# Patient Record
Sex: MALE | Race: WHITE | Employment: OTHER | URBAN - METROPOLITAN AREA
[De-identification: names, ages, dates, MRNs, and addresses within clinical notes are randomized per-mention and may not be internally consistent; named-entity substitution may affect disease eponyms.]

---

## 2017-01-01 ENCOUNTER — OFFICE VISIT (OUTPATIENT)
Dept: UROLOGY | Facility: CLINIC | Age: 82
End: 2017-01-01
Payer: COMMERCIAL

## 2017-01-01 ENCOUNTER — DOCUMENTATION ONLY (OUTPATIENT)
Dept: CARE COORDINATION | Facility: CLINIC | Age: 82
End: 2017-01-01

## 2017-01-01 ENCOUNTER — PRE VISIT (OUTPATIENT)
Dept: UROLOGY | Facility: CLINIC | Age: 82
End: 2017-01-01

## 2017-01-01 ENCOUNTER — TELEPHONE (OUTPATIENT)
Dept: UROLOGY | Facility: CLINIC | Age: 82
End: 2017-01-01

## 2017-01-01 ENCOUNTER — HOSPITAL ENCOUNTER (OUTPATIENT)
Facility: CLINIC | Age: 82
Setting detail: OBSERVATION
Discharge: NURSING, REHAB OR LONG-TERM CARE | End: 2017-01-09
Attending: EMERGENCY MEDICINE | Admitting: INTERNAL MEDICINE
Payer: COMMERCIAL

## 2017-01-01 ENCOUNTER — CARE COORDINATION (OUTPATIENT)
Dept: CASE MANAGEMENT | Facility: CLINIC | Age: 82
End: 2017-01-01

## 2017-01-01 ENCOUNTER — APPOINTMENT (OUTPATIENT)
Dept: GENERAL RADIOLOGY | Facility: CLINIC | Age: 82
End: 2017-01-01
Attending: EMERGENCY MEDICINE
Payer: COMMERCIAL

## 2017-01-01 ENCOUNTER — TELEPHONE (OUTPATIENT)
Dept: FAMILY MEDICINE | Facility: CLINIC | Age: 82
End: 2017-01-01

## 2017-01-01 ENCOUNTER — OFFICE VISIT (OUTPATIENT)
Dept: PULMONOLOGY | Facility: CLINIC | Age: 82
End: 2017-01-01
Payer: COMMERCIAL

## 2017-01-01 ENCOUNTER — TELEPHONE (OUTPATIENT)
Dept: CARDIOLOGY | Facility: CLINIC | Age: 82
End: 2017-01-01

## 2017-01-01 ENCOUNTER — DOCUMENTATION ONLY (OUTPATIENT)
Dept: CASE MANAGEMENT | Facility: CLINIC | Age: 82
End: 2017-01-01

## 2017-01-01 ENCOUNTER — TELEPHONE (OUTPATIENT)
Dept: NURSING | Facility: CLINIC | Age: 82
End: 2017-01-01

## 2017-01-01 ENCOUNTER — HOSPITAL ENCOUNTER (OUTPATIENT)
Dept: ULTRASOUND IMAGING | Facility: CLINIC | Age: 82
End: 2017-02-02
Attending: SURGERY
Payer: COMMERCIAL

## 2017-01-01 ENCOUNTER — INFUSION THERAPY VISIT (OUTPATIENT)
Dept: INFUSION THERAPY | Facility: CLINIC | Age: 82
End: 2017-01-01
Payer: COMMERCIAL

## 2017-01-01 ENCOUNTER — OFFICE VISIT (OUTPATIENT)
Dept: OTHER | Facility: CLINIC | Age: 82
End: 2017-01-01
Attending: SURGERY
Payer: COMMERCIAL

## 2017-01-01 ENCOUNTER — TRANSFERRED RECORDS (OUTPATIENT)
Dept: HEALTH INFORMATION MANAGEMENT | Facility: CLINIC | Age: 82
End: 2017-01-01

## 2017-01-01 ENCOUNTER — HOSPITAL ENCOUNTER (OUTPATIENT)
Dept: ULTRASOUND IMAGING | Facility: CLINIC | Age: 82
Discharge: HOME | End: 2017-02-02
Attending: SURGERY | Admitting: SURGERY
Payer: COMMERCIAL

## 2017-01-01 ENCOUNTER — OFFICE VISIT (OUTPATIENT)
Dept: FAMILY MEDICINE | Facility: CLINIC | Age: 82
End: 2017-01-01
Payer: COMMERCIAL

## 2017-01-01 ENCOUNTER — APPOINTMENT (OUTPATIENT)
Dept: PHYSICAL THERAPY | Facility: CLINIC | Age: 82
End: 2017-01-01
Attending: PHYSICIAN ASSISTANT
Payer: COMMERCIAL

## 2017-01-01 ENCOUNTER — TELEPHONE (OUTPATIENT)
Dept: PHARMACY | Facility: CLINIC | Age: 82
End: 2017-01-01

## 2017-01-01 ENCOUNTER — MEDICAL CORRESPONDENCE (OUTPATIENT)
Dept: HEALTH INFORMATION MANAGEMENT | Facility: CLINIC | Age: 82
End: 2017-01-01

## 2017-01-01 VITALS
TEMPERATURE: 98.4 F | HEART RATE: 96 BPM | DIASTOLIC BLOOD PRESSURE: 68 MMHG | SYSTOLIC BLOOD PRESSURE: 118 MMHG | RESPIRATION RATE: 16 BRPM | OXYGEN SATURATION: 96 %

## 2017-01-01 VITALS
OXYGEN SATURATION: 96 % | TEMPERATURE: 97.9 F | HEART RATE: 72 BPM | SYSTOLIC BLOOD PRESSURE: 151 MMHG | RESPIRATION RATE: 18 BRPM | DIASTOLIC BLOOD PRESSURE: 73 MMHG

## 2017-01-01 VITALS — RESPIRATION RATE: 16 BRPM | SYSTOLIC BLOOD PRESSURE: 90 MMHG | DIASTOLIC BLOOD PRESSURE: 60 MMHG | HEART RATE: 66 BPM

## 2017-01-01 VITALS
TEMPERATURE: 96.8 F | SYSTOLIC BLOOD PRESSURE: 156 MMHG | HEART RATE: 61 BPM | OXYGEN SATURATION: 92 % | DIASTOLIC BLOOD PRESSURE: 70 MMHG

## 2017-01-01 VITALS
RESPIRATION RATE: 18 BRPM | TEMPERATURE: 97.5 F | BODY MASS INDEX: 20.37 KG/M2 | OXYGEN SATURATION: 97 % | DIASTOLIC BLOOD PRESSURE: 69 MMHG | SYSTOLIC BLOOD PRESSURE: 117 MMHG | WEIGHT: 138 LBS | HEART RATE: 80 BPM

## 2017-01-01 VITALS
WEIGHT: 137.79 LBS | SYSTOLIC BLOOD PRESSURE: 151 MMHG | TEMPERATURE: 97.2 F | RESPIRATION RATE: 16 BRPM | BODY MASS INDEX: 20.41 KG/M2 | DIASTOLIC BLOOD PRESSURE: 65 MMHG | HEIGHT: 69 IN | OXYGEN SATURATION: 96 %

## 2017-01-01 VITALS
SYSTOLIC BLOOD PRESSURE: 131 MMHG | HEART RATE: 80 BPM | DIASTOLIC BLOOD PRESSURE: 63 MMHG | WEIGHT: 140 LBS | OXYGEN SATURATION: 95 % | BODY MASS INDEX: 20.73 KG/M2 | HEIGHT: 69 IN

## 2017-01-01 VITALS — SYSTOLIC BLOOD PRESSURE: 132 MMHG | HEART RATE: 80 BPM | DIASTOLIC BLOOD PRESSURE: 63 MMHG

## 2017-01-01 DIAGNOSIS — R52 PAIN: Primary | ICD-10-CM

## 2017-01-01 DIAGNOSIS — Z98.890 HISTORY OF CAROTID ENDARTERECTOMY: ICD-10-CM

## 2017-01-01 DIAGNOSIS — N18.30 CHRONIC KIDNEY DISEASE, STAGE III (MODERATE) (H): ICD-10-CM

## 2017-01-01 DIAGNOSIS — I71.40 ABDOMINAL AORTIC ANEURYSM (H): ICD-10-CM

## 2017-01-01 DIAGNOSIS — R53.1 WEAKNESS: Primary | ICD-10-CM

## 2017-01-01 DIAGNOSIS — Z98.890 S/P AAA REPAIR: ICD-10-CM

## 2017-01-01 DIAGNOSIS — Z13.89 SCREENING FOR DIABETIC PERIPHERAL NEUROPATHY: ICD-10-CM

## 2017-01-01 DIAGNOSIS — D64.89 ANEMIA DUE TO OTHER CAUSE: ICD-10-CM

## 2017-01-01 DIAGNOSIS — D64.9 ANEMIA, UNSPECIFIED TYPE: Primary | ICD-10-CM

## 2017-01-01 DIAGNOSIS — F41.9 ANXIETY: ICD-10-CM

## 2017-01-01 DIAGNOSIS — R53.1 WEAKNESS: ICD-10-CM

## 2017-01-01 DIAGNOSIS — N18.9 ANEMIA IN CKD (CHRONIC KIDNEY DISEASE): Primary | ICD-10-CM

## 2017-01-01 DIAGNOSIS — Z98.890 S/P BILATERAL CAROTID ENDARTERECTOMY: ICD-10-CM

## 2017-01-01 DIAGNOSIS — J90 PLEURAL EFFUSION: Primary | ICD-10-CM

## 2017-01-01 DIAGNOSIS — D63.1 ANEMIA IN CKD (CHRONIC KIDNEY DISEASE): Primary | ICD-10-CM

## 2017-01-01 DIAGNOSIS — D63.1 ANEMIA OF CHRONIC RENAL FAILURE, STAGE 3 (MODERATE) (H): Primary | ICD-10-CM

## 2017-01-01 DIAGNOSIS — R33.9 RETENTION OF URINE: Primary | ICD-10-CM

## 2017-01-01 DIAGNOSIS — N18.30 ANEMIA OF CHRONIC RENAL FAILURE, STAGE 3 (MODERATE) (H): Primary | ICD-10-CM

## 2017-01-01 DIAGNOSIS — N18.9 ANEMIA OF CHRONIC RENAL FAILURE: Primary | ICD-10-CM

## 2017-01-01 DIAGNOSIS — D64.89 ANEMIA DUE TO OTHER CAUSE: Primary | ICD-10-CM

## 2017-01-01 DIAGNOSIS — I42.9 CARDIOMYOPATHY (H): ICD-10-CM

## 2017-01-01 DIAGNOSIS — D63.1 ANEMIA OF CHRONIC RENAL FAILURE: Primary | ICD-10-CM

## 2017-01-01 DIAGNOSIS — Z98.890 HISTORY OF AAA (ABDOMINAL AORTIC ANEURYSM) REPAIR: Primary | ICD-10-CM

## 2017-01-01 DIAGNOSIS — Z86.79 S/P AAA REPAIR: ICD-10-CM

## 2017-01-01 DIAGNOSIS — R33.9 URINARY RETENTION: Primary | ICD-10-CM

## 2017-01-01 DIAGNOSIS — N40.1 BENIGN PROSTATIC HYPERPLASIA WITH LOWER URINARY TRACT SYMPTOMS, UNSPECIFIED MORPHOLOGY: ICD-10-CM

## 2017-01-01 DIAGNOSIS — Z98.890 H/O CAROTID ENDARTERECTOMY: Primary | ICD-10-CM

## 2017-01-01 LAB
ABO + RH BLD: ABNORMAL
ABO + RH BLD: ABNORMAL
ALBUMIN UR-MCNC: 100 MG/DL
ANION GAP SERPL CALCULATED.3IONS-SCNC: 10 MMOL/L (ref 3–14)
ANION GAP SERPL CALCULATED.3IONS-SCNC: 9 MMOL/L (ref 3–14)
APPEARANCE UR: CLEAR
BASOPHILS # BLD AUTO: 0 10E9/L (ref 0–0.2)
BASOPHILS # BLD AUTO: 0 10E9/L (ref 0–0.2)
BASOPHILS NFR BLD AUTO: 0.1 %
BASOPHILS NFR BLD AUTO: 0.4 %
BILIRUB UR QL STRIP: NEGATIVE
BLD GP AB SCN SERPL QL: ABNORMAL
BLD PROD TYP BPU: ABNORMAL
BLD PROD TYP BPU: NORMAL
BLD UNIT ID BPU: 0
BLOOD BANK CMNT PATIENT-IMP: ABNORMAL
BLOOD PRODUCT CODE: NORMAL
BPU ID: NORMAL
BUN SERPL-MCNC: 26 MG/DL (ref 7–30)
BUN SERPL-MCNC: 26 MG/DL (ref 7–30)
CALCIUM SERPL-MCNC: 7.8 MG/DL (ref 8.5–10.1)
CALCIUM SERPL-MCNC: 7.9 MG/DL (ref 8.5–10.1)
CHLORIDE SERPL-SCNC: 106 MMOL/L (ref 94–109)
CHLORIDE SERPL-SCNC: 109 MMOL/L (ref 94–109)
CO2 SERPL-SCNC: 25 MMOL/L (ref 20–32)
CO2 SERPL-SCNC: 27 MMOL/L (ref 20–32)
COLOR UR AUTO: YELLOW
CREAT SERPL-MCNC: 2.13 MG/DL (ref 0.66–1.25)
CREAT SERPL-MCNC: 2.16 MG/DL (ref 0.66–1.25)
DIFFERENTIAL METHOD BLD: ABNORMAL
DIFFERENTIAL METHOD BLD: ABNORMAL
EOSINOPHIL # BLD AUTO: 0.1 10E9/L (ref 0–0.7)
EOSINOPHIL # BLD AUTO: 0.2 10E9/L (ref 0–0.7)
EOSINOPHIL NFR BLD AUTO: 1.5 %
EOSINOPHIL NFR BLD AUTO: 2.3 %
ERYTHROCYTE [DISTWIDTH] IN BLOOD BY AUTOMATED COUNT: 14.8 % (ref 10–15)
ERYTHROCYTE [DISTWIDTH] IN BLOOD BY AUTOMATED COUNT: 14.9 % (ref 10–15)
ERYTHROCYTE [DISTWIDTH] IN BLOOD BY AUTOMATED COUNT: 16.4 % (ref 10–15)
ERYTHROCYTE [DISTWIDTH] IN BLOOD BY AUTOMATED COUNT: 17.5 % (ref 10–15)
FERRITIN SERPL-MCNC: 418 NG/ML (ref 26–388)
GFR SERPL CREATININE-BSD FRML MDRD: 30 ML/MIN/1.7M2
GFR SERPL CREATININE-BSD FRML MDRD: 30 ML/MIN/1.7M2
GLUCOSE BLDC GLUCOMTR-MCNC: 105 MG/DL (ref 70–99)
GLUCOSE BLDC GLUCOMTR-MCNC: 106 MG/DL (ref 70–99)
GLUCOSE BLDC GLUCOMTR-MCNC: 110 MG/DL (ref 70–99)
GLUCOSE BLDC GLUCOMTR-MCNC: 122 MG/DL (ref 70–99)
GLUCOSE BLDC GLUCOMTR-MCNC: 123 MG/DL (ref 70–99)
GLUCOSE BLDC GLUCOMTR-MCNC: 127 MG/DL (ref 70–99)
GLUCOSE BLDC GLUCOMTR-MCNC: 136 MG/DL (ref 70–99)
GLUCOSE BLDC GLUCOMTR-MCNC: 93 MG/DL (ref 70–99)
GLUCOSE SERPL-MCNC: 144 MG/DL (ref 70–99)
GLUCOSE SERPL-MCNC: 227 MG/DL (ref 70–99)
GLUCOSE UR STRIP-MCNC: NEGATIVE MG/DL
HBA1C MFR BLD: 6.9 % (ref 4.3–6)
HCT VFR BLD AUTO: 23.1 % (ref 40–53)
HCT VFR BLD AUTO: 27.9 % (ref 40–53)
HCT VFR BLD AUTO: 28.6 % (ref 40–53)
HCT VFR BLD AUTO: 31.5 % (ref 40–53)
HEMOCCULT STL QL: NEGATIVE
HEMOCCULT STL QL: NEGATIVE
HGB BLD-MCNC: 7 G/DL (ref 13.3–17.7)
HGB BLD-MCNC: 8.1 G/DL (ref 13.3–17.7)
HGB BLD-MCNC: 8.3 G/DL (ref 13.3–17.7)
HGB BLD-MCNC: 9.3 G/DL (ref 13.3–17.7)
HGB BLD-MCNC: 9.9 G/DL (ref 13.3–17.7)
HGB UR QL STRIP: NEGATIVE
HYALINE CASTS #/AREA URNS LPF: 1 /LPF (ref 0–2)
IMM GRANULOCYTES # BLD: 0 10E9/L (ref 0–0.4)
IMM GRANULOCYTES NFR BLD: 0.4 %
INR PPP: 1.51 (ref 0.86–1.14)
INTERPRETATION ECG - MUSE: NORMAL
IRON SATN MFR SERPL: 11 % (ref 15–46)
IRON SERPL-MCNC: 11 UG/DL (ref 35–180)
KETONES UR STRIP-MCNC: NEGATIVE MG/DL
LEUKOCYTE ESTERASE UR QL STRIP: ABNORMAL
LYMPHOCYTES # BLD AUTO: 0.9 10E9/L (ref 0.8–5.3)
LYMPHOCYTES # BLD AUTO: 1.9 10E9/L (ref 0.8–5.3)
LYMPHOCYTES NFR BLD AUTO: 12.6 %
LYMPHOCYTES NFR BLD AUTO: 23.7 %
MCH RBC QN AUTO: 25.8 PG (ref 26.5–33)
MCH RBC QN AUTO: 27 PG (ref 26.5–33)
MCH RBC QN AUTO: 27.5 PG (ref 26.5–33)
MCH RBC QN AUTO: 28.3 PG (ref 26.5–33)
MCHC RBC AUTO-ENTMCNC: 28.3 G/DL (ref 31.5–36.5)
MCHC RBC AUTO-ENTMCNC: 29.7 G/DL (ref 31.5–36.5)
MCHC RBC AUTO-ENTMCNC: 30.3 G/DL (ref 31.5–36.5)
MCHC RBC AUTO-ENTMCNC: 31.4 G/DL (ref 31.5–36.5)
MCV RBC AUTO: 89 FL (ref 78–100)
MCV RBC AUTO: 90 FL (ref 78–100)
MCV RBC AUTO: 91 FL (ref 78–100)
MCV RBC AUTO: 92 FL (ref 78–100)
MONOCYTES # BLD AUTO: 0.5 10E9/L (ref 0–1.3)
MONOCYTES # BLD AUTO: 0.6 10E9/L (ref 0–1.3)
MONOCYTES NFR BLD AUTO: 6.6 %
MONOCYTES NFR BLD AUTO: 7.8 %
MUCOUS THREADS #/AREA URNS LPF: PRESENT /LPF
NEUTROPHILS # BLD AUTO: 5.5 10E9/L (ref 1.6–8.3)
NEUTROPHILS # BLD AUTO: 5.6 10E9/L (ref 1.6–8.3)
NEUTROPHILS NFR BLD AUTO: 67.3 %
NEUTROPHILS NFR BLD AUTO: 77.3 %
NITRATE UR QL: NEGATIVE
NRBC # BLD AUTO: 0 10*3/UL
NRBC BLD AUTO-RTO: 0 /100
NUM BPU REQUESTED: 4
PH UR STRIP: 5.5 PH (ref 5–7)
PLATELET # BLD AUTO: 213 10E9/L (ref 150–450)
PLATELET # BLD AUTO: 237 10E9/L (ref 150–450)
PLATELET # BLD AUTO: 238 10E9/L (ref 150–450)
PLATELET # BLD AUTO: 242 10E9/L (ref 150–450)
POTASSIUM SERPL-SCNC: 4.2 MMOL/L (ref 3.4–5.3)
POTASSIUM SERPL-SCNC: 4.2 MMOL/L (ref 3.4–5.3)
RBC # BLD AUTO: 2.59 10E12/L (ref 4.4–5.9)
RBC # BLD AUTO: 3.02 10E12/L (ref 4.4–5.9)
RBC # BLD AUTO: 3.14 10E12/L (ref 4.4–5.9)
RBC # BLD AUTO: 3.5 10E12/L (ref 4.4–5.9)
RBC #/AREA URNS AUTO: 2 /HPF (ref 0–2)
RETICS # AUTO: 38.8 10E9/L (ref 25–95)
RETICS/RBC NFR AUTO: 1.5 % (ref 0.5–2)
SODIUM SERPL-SCNC: 141 MMOL/L (ref 133–144)
SODIUM SERPL-SCNC: 145 MMOL/L (ref 133–144)
SP GR UR STRIP: 1.01 (ref 1–1.03)
SPECIMEN EXP DATE BLD: ABNORMAL
TIBC SERPL-MCNC: 104 UG/DL (ref 240–430)
TRANSFERRIN SERPL-MCNC: 84 MG/DL (ref 210–360)
TRANSFUSION STATUS PATIENT QL: NORMAL
TROPONIN I SERPL-MCNC: NORMAL UG/L (ref 0–0.04)
URN SPEC COLLECT METH UR: ABNORMAL
UROBILINOGEN UR STRIP-MCNC: NORMAL MG/DL (ref 0–2)
WBC # BLD AUTO: 7.2 10E9/L (ref 4–11)
WBC # BLD AUTO: 8.2 10E9/L (ref 4–11)
WBC # BLD AUTO: 9.2 10E9/L (ref 4–11)
WBC # BLD AUTO: 9.9 10E9/L (ref 4–11)
WBC #/AREA URNS AUTO: 10 /HPF (ref 0–2)

## 2017-01-01 PROCEDURE — 84484 ASSAY OF TROPONIN QUANT: CPT | Performed by: EMERGENCY MEDICINE

## 2017-01-01 PROCEDURE — 93880 EXTRACRANIAL BILAT STUDY: CPT

## 2017-01-01 PROCEDURE — 25000125 ZZHC RX 250: Performed by: INTERNAL MEDICINE

## 2017-01-01 PROCEDURE — G0378 HOSPITAL OBSERVATION PER HR: HCPCS

## 2017-01-01 PROCEDURE — 81001 URINALYSIS AUTO W/SCOPE: CPT | Performed by: INTERNAL MEDICINE

## 2017-01-01 PROCEDURE — 40000275 ZZH STATISTIC RCP TIME EA 10 MIN

## 2017-01-01 PROCEDURE — 82272 OCCULT BLD FECES 1-3 TESTS: CPT | Performed by: EMERGENCY MEDICINE

## 2017-01-01 PROCEDURE — 93225 XTRNL ECG REC<48 HRS REC: CPT

## 2017-01-01 PROCEDURE — 94640 AIRWAY INHALATION TREATMENT: CPT | Mod: 76

## 2017-01-01 PROCEDURE — 97161 PT EVAL LOW COMPLEX 20 MIN: CPT | Mod: GP

## 2017-01-01 PROCEDURE — 96374 THER/PROPH/DIAG INJ IV PUSH: CPT | Performed by: INTERNAL MEDICINE

## 2017-01-01 PROCEDURE — 51798 US URINE CAPACITY MEASURE: CPT | Performed by: UROLOGY

## 2017-01-01 PROCEDURE — 85027 COMPLETE CBC AUTOMATED: CPT | Performed by: INTERNAL MEDICINE

## 2017-01-01 PROCEDURE — 25000132 ZZH RX MED GY IP 250 OP 250 PS 637: Performed by: INTERNAL MEDICINE

## 2017-01-01 PROCEDURE — 86902 BLOOD TYPE ANTIGEN DONOR EA: CPT | Performed by: EMERGENCY MEDICINE

## 2017-01-01 PROCEDURE — 25000132 ZZH RX MED GY IP 250 OP 250 PS 637: Performed by: PHYSICIAN ASSISTANT

## 2017-01-01 PROCEDURE — 93978 VASCULAR STUDY: CPT

## 2017-01-01 PROCEDURE — 99211 OFF/OP EST MAY X REQ PHY/QHP: CPT

## 2017-01-01 PROCEDURE — P9016 RBC LEUKOCYTES REDUCED: HCPCS | Performed by: EMERGENCY MEDICINE

## 2017-01-01 PROCEDURE — 99213 OFFICE O/P EST LOW 20 MIN: CPT | Performed by: PHYSICIAN ASSISTANT

## 2017-01-01 PROCEDURE — 00000146 ZZHCL STATISTIC GLUCOSE BY METER IP

## 2017-01-01 PROCEDURE — 99220 ZZC INITIAL OBSERVATION CARE,LEVL III: CPT | Performed by: INTERNAL MEDICINE

## 2017-01-01 PROCEDURE — 36430 TRANSFUSION BLD/BLD COMPNT: CPT

## 2017-01-01 PROCEDURE — 36415 COLL VENOUS BLD VENIPUNCTURE: CPT | Performed by: FAMILY MEDICINE

## 2017-01-01 PROCEDURE — 82272 OCCULT BLD FECES 1-3 TESTS: CPT | Performed by: FAMILY MEDICINE

## 2017-01-01 PROCEDURE — 93227 XTRNL ECG REC<48 HR R&I: CPT | Performed by: INTERNAL MEDICINE

## 2017-01-01 PROCEDURE — 25000125 ZZHC RX 250: Mod: ZNDC | Performed by: PHYSICIAN ASSISTANT

## 2017-01-01 PROCEDURE — 94640 AIRWAY INHALATION TREATMENT: CPT

## 2017-01-01 PROCEDURE — 85610 PROTHROMBIN TIME: CPT | Performed by: EMERGENCY MEDICINE

## 2017-01-01 PROCEDURE — 86922 COMPATIBILITY TEST ANTIGLOB: CPT | Performed by: EMERGENCY MEDICINE

## 2017-01-01 PROCEDURE — 83540 ASSAY OF IRON: CPT | Performed by: EMERGENCY MEDICINE

## 2017-01-01 PROCEDURE — 80048 BASIC METABOLIC PNL TOTAL CA: CPT | Performed by: EMERGENCY MEDICINE

## 2017-01-01 PROCEDURE — 36415 COLL VENOUS BLD VENIPUNCTURE: CPT | Performed by: INTERNAL MEDICINE

## 2017-01-01 PROCEDURE — 99285 EMERGENCY DEPT VISIT HI MDM: CPT | Mod: 25

## 2017-01-01 PROCEDURE — 86901 BLOOD TYPING SEROLOGIC RH(D): CPT | Performed by: EMERGENCY MEDICINE

## 2017-01-01 PROCEDURE — 99214 OFFICE O/P EST MOD 30 MIN: CPT | Performed by: FAMILY MEDICINE

## 2017-01-01 PROCEDURE — 84466 ASSAY OF TRANSFERRIN: CPT | Performed by: EMERGENCY MEDICINE

## 2017-01-01 PROCEDURE — 82728 ASSAY OF FERRITIN: CPT | Performed by: EMERGENCY MEDICINE

## 2017-01-01 PROCEDURE — 85027 COMPLETE CBC AUTOMATED: CPT | Performed by: FAMILY MEDICINE

## 2017-01-01 PROCEDURE — 99217 ZZC OBSERVATION CARE DISCHARGE: CPT | Performed by: PHYSICIAN ASSISTANT

## 2017-01-01 PROCEDURE — 99213 OFFICE O/P EST LOW 20 MIN: CPT | Performed by: FAMILY MEDICINE

## 2017-01-01 PROCEDURE — 80048 BASIC METABOLIC PNL TOTAL CA: CPT | Performed by: FAMILY MEDICINE

## 2017-01-01 PROCEDURE — 85025 COMPLETE CBC W/AUTO DIFF WBC: CPT | Performed by: EMERGENCY MEDICINE

## 2017-01-01 PROCEDURE — 97530 THERAPEUTIC ACTIVITIES: CPT | Mod: GP

## 2017-01-01 PROCEDURE — 83550 IRON BINDING TEST: CPT | Performed by: EMERGENCY MEDICINE

## 2017-01-01 PROCEDURE — 99207 C FOOT EXAM  NO CHARGE: CPT | Performed by: FAMILY MEDICINE

## 2017-01-01 PROCEDURE — 85025 COMPLETE CBC W/AUTO DIFF WBC: CPT | Performed by: FAMILY MEDICINE

## 2017-01-01 PROCEDURE — 85018 HEMOGLOBIN: CPT | Performed by: INTERNAL MEDICINE

## 2017-01-01 PROCEDURE — 40000193 ZZH STATISTIC PT WARD VISIT

## 2017-01-01 PROCEDURE — 99225 ZZC SUBSEQUENT OBSERVATION CARE,LEVEL II: CPT | Performed by: INTERNAL MEDICINE

## 2017-01-01 PROCEDURE — 99214 OFFICE O/P EST MOD 30 MIN: CPT | Mod: ZP | Performed by: SURGERY

## 2017-01-01 PROCEDURE — 51702 INSERT TEMP BLADDER CATH: CPT | Performed by: UROLOGY

## 2017-01-01 PROCEDURE — 93005 ELECTROCARDIOGRAM TRACING: CPT

## 2017-01-01 PROCEDURE — 86900 BLOOD TYPING SEROLOGIC ABO: CPT | Performed by: EMERGENCY MEDICINE

## 2017-01-01 PROCEDURE — 83036 HEMOGLOBIN GLYCOSYLATED A1C: CPT | Performed by: INTERNAL MEDICINE

## 2017-01-01 PROCEDURE — 85045 AUTOMATED RETICULOCYTE COUNT: CPT | Performed by: EMERGENCY MEDICINE

## 2017-01-01 PROCEDURE — 99226 ZZC SUBSEQUENT OBSERVATION CARE,LEVEL III: CPT | Performed by: PHYSICIAN ASSISTANT

## 2017-01-01 PROCEDURE — 99207 ZZC NO CHARGE LOS: CPT

## 2017-01-01 PROCEDURE — 71020 XR CHEST 2 VW: CPT

## 2017-01-01 PROCEDURE — 99214 OFFICE O/P EST MOD 30 MIN: CPT | Performed by: INTERNAL MEDICINE

## 2017-01-01 PROCEDURE — 86850 RBC ANTIBODY SCREEN: CPT | Performed by: EMERGENCY MEDICINE

## 2017-01-01 RX ORDER — POLYETHYLENE GLYCOL 3350 17 G/17G
17 POWDER, FOR SOLUTION ORAL DAILY PRN
Status: DISCONTINUED | OUTPATIENT
Start: 2017-01-01 | End: 2017-01-01 | Stop reason: HOSPADM

## 2017-01-01 RX ORDER — PROCHLORPERAZINE MALEATE 5 MG
5 TABLET ORAL EVERY 6 HOURS PRN
Status: DISCONTINUED | OUTPATIENT
Start: 2017-01-01 | End: 2017-01-01 | Stop reason: HOSPADM

## 2017-01-01 RX ORDER — BISACODYL 10 MG
10 SUPPOSITORY, RECTAL RECTAL DAILY PRN
Status: DISCONTINUED | OUTPATIENT
Start: 2017-01-01 | End: 2017-01-01 | Stop reason: HOSPADM

## 2017-01-01 RX ORDER — LIDOCAINE 40 MG/G
CREAM TOPICAL
Status: DISCONTINUED | OUTPATIENT
Start: 2017-01-01 | End: 2017-01-01 | Stop reason: HOSPADM

## 2017-01-01 RX ORDER — AMOXICILLIN 250 MG
1-2 CAPSULE ORAL 2 TIMES DAILY PRN
Status: DISCONTINUED | OUTPATIENT
Start: 2017-01-01 | End: 2017-01-01 | Stop reason: HOSPADM

## 2017-01-01 RX ORDER — ROSUVASTATIN CALCIUM 5 MG/1
5 TABLET, COATED ORAL
Status: DISCONTINUED | OUTPATIENT
Start: 2017-01-01 | End: 2017-01-01 | Stop reason: HOSPADM

## 2017-01-01 RX ORDER — DEXTROSE MONOHYDRATE 25 G/50ML
25-50 INJECTION, SOLUTION INTRAVENOUS
Status: DISCONTINUED | OUTPATIENT
Start: 2017-01-01 | End: 2017-01-01 | Stop reason: HOSPADM

## 2017-01-01 RX ORDER — CLONAZEPAM 0.5 MG/1
0.25 TABLET ORAL 2 TIMES DAILY PRN
Qty: 30 TABLET | Refills: 0 | Status: SHIPPED | OUTPATIENT
Start: 2017-01-01

## 2017-01-01 RX ORDER — PROCHLORPERAZINE 25 MG
12.5 SUPPOSITORY, RECTAL RECTAL EVERY 12 HOURS PRN
Status: DISCONTINUED | OUTPATIENT
Start: 2017-01-01 | End: 2017-01-01 | Stop reason: HOSPADM

## 2017-01-01 RX ORDER — FERROUS SULFATE 325(65) MG
325 TABLET ORAL DAILY
Status: DISCONTINUED | OUTPATIENT
Start: 2017-01-01 | End: 2017-01-01 | Stop reason: HOSPADM

## 2017-01-01 RX ORDER — HYDROCODONE BITARTRATE AND ACETAMINOPHEN 7.5; 325 MG/1; MG/1
0.5 TABLET ORAL 3 TIMES DAILY PRN
Qty: 45 TABLET | Refills: 0 | Status: SHIPPED | OUTPATIENT
Start: 2017-01-01

## 2017-01-01 RX ORDER — NALOXONE HYDROCHLORIDE 0.4 MG/ML
.1-.4 INJECTION, SOLUTION INTRAMUSCULAR; INTRAVENOUS; SUBCUTANEOUS
Status: DISCONTINUED | OUTPATIENT
Start: 2017-01-01 | End: 2017-01-01 | Stop reason: HOSPADM

## 2017-01-01 RX ORDER — ONDANSETRON 2 MG/ML
4 INJECTION INTRAMUSCULAR; INTRAVENOUS EVERY 6 HOURS PRN
Status: DISCONTINUED | OUTPATIENT
Start: 2017-01-01 | End: 2017-01-01 | Stop reason: HOSPADM

## 2017-01-01 RX ORDER — ISOSORBIDE MONONITRATE 30 MG/1
30 TABLET, EXTENDED RELEASE ORAL DAILY
Status: DISCONTINUED | OUTPATIENT
Start: 2017-01-01 | End: 2017-01-01 | Stop reason: HOSPADM

## 2017-01-01 RX ORDER — ASPIRIN 81 MG/1
81 TABLET, CHEWABLE ORAL EVERY EVENING
Status: DISCONTINUED | OUTPATIENT
Start: 2017-01-01 | End: 2017-01-01 | Stop reason: HOSPADM

## 2017-01-01 RX ORDER — AMLODIPINE BESYLATE 5 MG/1
5 TABLET ORAL DAILY
Status: DISCONTINUED | OUTPATIENT
Start: 2017-01-01 | End: 2017-01-01 | Stop reason: HOSPADM

## 2017-01-01 RX ORDER — OXYCODONE HYDROCHLORIDE 5 MG/1
5 TABLET ORAL EVERY 4 HOURS PRN
Status: DISCONTINUED | OUTPATIENT
Start: 2017-01-01 | End: 2017-01-01

## 2017-01-01 RX ORDER — HYDROCODONE BITARTRATE AND ACETAMINOPHEN 7.5; 325 MG/1; MG/1
0.5 TABLET ORAL 3 TIMES DAILY PRN
Qty: 45 TABLET | Refills: 0 | Status: SHIPPED | OUTPATIENT
Start: 2017-01-01 | End: 2017-01-01

## 2017-01-01 RX ORDER — NICOTINE POLACRILEX 4 MG
15-30 LOZENGE BUCCAL
Status: DISCONTINUED | OUTPATIENT
Start: 2017-01-01 | End: 2017-01-01 | Stop reason: HOSPADM

## 2017-01-01 RX ORDER — GABAPENTIN 100 MG/1
100 CAPSULE ORAL 3 TIMES DAILY
Status: DISCONTINUED | OUTPATIENT
Start: 2017-01-01 | End: 2017-01-01 | Stop reason: HOSPADM

## 2017-01-01 RX ORDER — IPRATROPIUM BROMIDE AND ALBUTEROL SULFATE 2.5; .5 MG/3ML; MG/3ML
3 SOLUTION RESPIRATORY (INHALATION) 4 TIMES DAILY
Status: DISCONTINUED | OUTPATIENT
Start: 2017-01-01 | End: 2017-01-01 | Stop reason: HOSPADM

## 2017-01-01 RX ORDER — TAMSULOSIN HYDROCHLORIDE 0.4 MG/1
0.4 CAPSULE ORAL DAILY
Status: DISCONTINUED | OUTPATIENT
Start: 2017-01-01 | End: 2017-01-01 | Stop reason: HOSPADM

## 2017-01-01 RX ORDER — TAMSULOSIN HYDROCHLORIDE 0.4 MG/1
0.8 CAPSULE ORAL DAILY
Qty: 180 CAPSULE | Refills: 3 | Status: SHIPPED | OUTPATIENT
Start: 2017-01-01

## 2017-01-01 RX ORDER — ALBUTEROL SULFATE 0.83 MG/ML
3 SOLUTION RESPIRATORY (INHALATION)
Status: DISCONTINUED | OUTPATIENT
Start: 2017-01-01 | End: 2017-01-01 | Stop reason: HOSPADM

## 2017-01-01 RX ORDER — CLONAZEPAM 0.5 MG/1
0.25 TABLET ORAL 2 TIMES DAILY PRN
Qty: 30 TABLET | Refills: 0 | Status: SHIPPED | OUTPATIENT
Start: 2017-01-01 | End: 2017-01-01

## 2017-01-01 RX ORDER — ACETAMINOPHEN 500 MG
1000 TABLET ORAL EVERY 8 HOURS PRN
Status: DISCONTINUED | OUTPATIENT
Start: 2017-01-01 | End: 2017-01-01 | Stop reason: HOSPADM

## 2017-01-01 RX ORDER — ONDANSETRON 4 MG/1
4 TABLET, ORALLY DISINTEGRATING ORAL EVERY 6 HOURS PRN
Status: DISCONTINUED | OUTPATIENT
Start: 2017-01-01 | End: 2017-01-01 | Stop reason: HOSPADM

## 2017-01-01 RX ORDER — PANTOPRAZOLE SODIUM 40 MG/1
40 TABLET, DELAYED RELEASE ORAL DAILY
Status: DISCONTINUED | OUTPATIENT
Start: 2017-01-01 | End: 2017-01-01 | Stop reason: HOSPADM

## 2017-01-01 RX ORDER — HYDRALAZINE HYDROCHLORIDE 25 MG/1
25 TABLET, FILM COATED ORAL 2 TIMES DAILY
Status: DISCONTINUED | OUTPATIENT
Start: 2017-01-01 | End: 2017-01-01 | Stop reason: HOSPADM

## 2017-01-01 RX ORDER — HYDROCODONE BITARTRATE AND ACETAMINOPHEN 7.5; 325 MG/15ML; MG/15ML
7.5 SOLUTION ORAL 3 TIMES DAILY PRN
Status: DISCONTINUED | OUTPATIENT
Start: 2017-01-01 | End: 2017-01-01 | Stop reason: HOSPADM

## 2017-01-01 RX ADMIN — ISOSORBIDE MONONITRATE 30 MG: 30 TABLET, EXTENDED RELEASE ORAL at 08:47

## 2017-01-01 RX ADMIN — ACETAMINOPHEN 1000 MG: 500 TABLET, COATED ORAL at 03:36

## 2017-01-01 RX ADMIN — LIDOCAINE HYDROCHLORIDE 10 ML: 20 JELLY TOPICAL at 15:42

## 2017-01-01 RX ADMIN — GABAPENTIN 100 MG: 100 CAPSULE ORAL at 08:45

## 2017-01-01 RX ADMIN — HYDROCODONE BITARTRATE AND ACETAMINOPHEN 7.5 ML: 2.5; 108 SOLUTION ORAL at 19:02

## 2017-01-01 RX ADMIN — IPRATROPIUM BROMIDE AND ALBUTEROL SULFATE 3 ML: .5; 3 SOLUTION RESPIRATORY (INHALATION) at 08:58

## 2017-01-01 RX ADMIN — ROSUVASTATIN CALCIUM 5 MG: 5 TABLET, FILM COATED ORAL at 22:27

## 2017-01-01 RX ADMIN — ASPIRIN 81 MG 81 MG: 81 TABLET ORAL at 21:09

## 2017-01-01 RX ADMIN — PANTOPRAZOLE SODIUM 40 MG: 40 TABLET, DELAYED RELEASE ORAL at 08:22

## 2017-01-01 RX ADMIN — OXYCODONE HYDROCHLORIDE 5 MG: 5 TABLET ORAL at 05:37

## 2017-01-01 RX ADMIN — FERROUS SULFATE TAB 325 MG (65 MG ELEMENTAL FE) 325 MG: 325 (65 FE) TAB at 08:48

## 2017-01-01 RX ADMIN — TAMSULOSIN HYDROCHLORIDE 0.4 MG: 0.4 CAPSULE ORAL at 10:07

## 2017-01-01 RX ADMIN — APIXABAN 2.5 MG: 2.5 TABLET, FILM COATED ORAL at 07:55

## 2017-01-01 RX ADMIN — ISOSORBIDE MONONITRATE 30 MG: 30 TABLET, EXTENDED RELEASE ORAL at 07:57

## 2017-01-01 RX ADMIN — IPRATROPIUM BROMIDE AND ALBUTEROL SULFATE 3 ML: .5; 3 SOLUTION RESPIRATORY (INHALATION) at 11:10

## 2017-01-01 RX ADMIN — HYDRALAZINE HYDROCHLORIDE 25 MG: 25 TABLET ORAL at 07:57

## 2017-01-01 RX ADMIN — HYDROCODONE BITARTRATE AND ACETAMINOPHEN 7.5 ML: 2.5; 108 SOLUTION ORAL at 13:15

## 2017-01-01 RX ADMIN — IPRATROPIUM BROMIDE AND ALBUTEROL SULFATE 3 ML: .5; 3 SOLUTION RESPIRATORY (INHALATION) at 16:36

## 2017-01-01 RX ADMIN — ASPIRIN 81 MG 81 MG: 81 TABLET ORAL at 19:50

## 2017-01-01 RX ADMIN — HYDROCODONE BITARTRATE AND ACETAMINOPHEN 7.5 ML: 2.5; 108 SOLUTION ORAL at 09:24

## 2017-01-01 RX ADMIN — HYDRALAZINE HYDROCHLORIDE 25 MG: 25 TABLET ORAL at 19:50

## 2017-01-01 RX ADMIN — ISOSORBIDE MONONITRATE 30 MG: 30 TABLET, EXTENDED RELEASE ORAL at 08:22

## 2017-01-01 RX ADMIN — AMLODIPINE BESYLATE 5 MG: 5 TABLET ORAL at 07:57

## 2017-01-01 RX ADMIN — PANTOPRAZOLE SODIUM 40 MG: 40 TABLET, DELAYED RELEASE ORAL at 08:45

## 2017-01-01 RX ADMIN — Medication 12.5 MG: at 07:55

## 2017-01-01 RX ADMIN — HYDRALAZINE HYDROCHLORIDE 25 MG: 25 TABLET ORAL at 08:22

## 2017-01-01 RX ADMIN — HYDRALAZINE HYDROCHLORIDE 25 MG: 25 TABLET ORAL at 21:09

## 2017-01-01 RX ADMIN — FERROUS SULFATE TAB 325 MG (65 MG ELEMENTAL FE) 325 MG: 325 (65 FE) TAB at 08:22

## 2017-01-01 RX ADMIN — IPRATROPIUM BROMIDE AND ALBUTEROL SULFATE 3 ML: .5; 3 SOLUTION RESPIRATORY (INHALATION) at 16:23

## 2017-01-01 RX ADMIN — GABAPENTIN 100 MG: 100 CAPSULE ORAL at 21:09

## 2017-01-01 RX ADMIN — ACETAMINOPHEN 1000 MG: 500 TABLET, COATED ORAL at 10:32

## 2017-01-01 RX ADMIN — GABAPENTIN 100 MG: 100 CAPSULE ORAL at 19:50

## 2017-01-01 RX ADMIN — PANTOPRAZOLE SODIUM 40 MG: 40 TABLET, DELAYED RELEASE ORAL at 07:57

## 2017-01-01 RX ADMIN — GABAPENTIN 100 MG: 100 CAPSULE ORAL at 07:57

## 2017-01-01 RX ADMIN — AMLODIPINE BESYLATE 5 MG: 5 TABLET ORAL at 08:48

## 2017-01-01 RX ADMIN — SENNOSIDES AND DOCUSATE SODIUM 1 TABLET: 8.6; 5 TABLET ORAL at 14:25

## 2017-01-01 RX ADMIN — Medication 12.5 MG: at 19:49

## 2017-01-01 RX ADMIN — Medication 12.5 MG: at 21:08

## 2017-01-01 RX ADMIN — APIXABAN 2.5 MG: 2.5 TABLET, FILM COATED ORAL at 21:08

## 2017-01-01 RX ADMIN — ASPIRIN 81 MG 81 MG: 81 TABLET ORAL at 21:50

## 2017-01-01 RX ADMIN — Medication 12.5 MG: at 08:45

## 2017-01-01 RX ADMIN — ACETAMINOPHEN 1000 MG: 500 TABLET, COATED ORAL at 17:51

## 2017-01-01 RX ADMIN — APIXABAN 2.5 MG: 2.5 TABLET, FILM COATED ORAL at 08:48

## 2017-01-01 RX ADMIN — GABAPENTIN 100 MG: 100 CAPSULE ORAL at 08:22

## 2017-01-01 RX ADMIN — APIXABAN 2.5 MG: 2.5 TABLET, FILM COATED ORAL at 21:51

## 2017-01-01 RX ADMIN — TAMSULOSIN HYDROCHLORIDE 0.4 MG: 0.4 CAPSULE ORAL at 07:57

## 2017-01-01 RX ADMIN — Medication 12.5 MG: at 08:21

## 2017-01-01 RX ADMIN — IPRATROPIUM BROMIDE AND ALBUTEROL SULFATE 3 ML: .5; 3 SOLUTION RESPIRATORY (INHALATION) at 07:10

## 2017-01-01 RX ADMIN — APIXABAN 2.5 MG: 2.5 TABLET, FILM COATED ORAL at 19:50

## 2017-01-01 RX ADMIN — APIXABAN 2.5 MG: 2.5 TABLET, FILM COATED ORAL at 08:22

## 2017-01-01 RX ADMIN — Medication 12.5 MG: at 21:50

## 2017-01-01 RX ADMIN — ACETAMINOPHEN 1000 MG: 500 TABLET, COATED ORAL at 10:07

## 2017-01-01 RX ADMIN — GABAPENTIN 100 MG: 100 CAPSULE ORAL at 14:01

## 2017-01-01 RX ADMIN — GABAPENTIN 100 MG: 100 CAPSULE ORAL at 21:50

## 2017-01-01 RX ADMIN — IPRATROPIUM BROMIDE AND ALBUTEROL SULFATE 3 ML: .5; 3 SOLUTION RESPIRATORY (INHALATION) at 12:55

## 2017-01-01 RX ADMIN — OXYCODONE HYDROCHLORIDE 5 MG: 5 TABLET ORAL at 17:51

## 2017-01-01 RX ADMIN — GABAPENTIN 100 MG: 100 CAPSULE ORAL at 14:25

## 2017-01-01 RX ADMIN — HYDROCODONE BITARTRATE AND ACETAMINOPHEN 7.5 ML: 2.5; 108 SOLUTION ORAL at 08:54

## 2017-01-01 RX ADMIN — TAMSULOSIN HYDROCHLORIDE 0.4 MG: 0.4 CAPSULE ORAL at 08:22

## 2017-01-01 RX ADMIN — OXYCODONE HYDROCHLORIDE 5 MG: 5 TABLET ORAL at 00:57

## 2017-01-01 RX ADMIN — AMLODIPINE BESYLATE 5 MG: 5 TABLET ORAL at 08:22

## 2017-01-01 RX ADMIN — HYDRALAZINE HYDROCHLORIDE 25 MG: 25 TABLET ORAL at 10:07

## 2017-01-01 RX ADMIN — Medication 250 ML: at 14:40

## 2017-01-01 RX ADMIN — IPRATROPIUM BROMIDE AND ALBUTEROL SULFATE 3 ML: .5; 3 SOLUTION RESPIRATORY (INHALATION) at 08:11

## 2017-01-01 RX ADMIN — FERROUS SULFATE TAB 325 MG (65 MG ELEMENTAL FE) 325 MG: 325 (65 FE) TAB at 07:55

## 2017-01-01 ASSESSMENT — PAIN SCALES - GENERAL
PAINLEVEL: NO PAIN (0)
PAINLEVEL: NO PAIN (0)

## 2017-01-01 ASSESSMENT — ENCOUNTER SYMPTOMS
FEVER: 0
ABDOMINAL PAIN: 0
HEADACHES: 0
SHORTNESS OF BREATH: 0
BLOOD IN STOOL: 0

## 2017-01-02 NOTE — TELEPHONE ENCOUNTER
PREVISIT INFORMATION                                                    Jossue Simmonshan Cinthya scheduled for future visit at [facility] specialty clinics.    Patient is scheduled to see Daniela Castillo PA-C on 1/4/17 at 10:00am  Reason for visit: urinary retention, garcía in place, discuss catheter removal  Referring provider: self-referred  Has patient seen previous specialist? Yes.  Name of provider Dr. Wang, Clinic/Facility [facility].   Medical Records:  Available in chart.  Patient was previously seen at a [facility] or [facility] facility.    REVIEW                                                      New patient packet mailed to patient: No, to be given at check-in  Medication reconciliation complete: No      Current Outpatient Prescriptions   Medication Sig Dispense Refill     albuterol (2.5 MG/3ML) 0.083% neb solution Take 1 vial (2.5 mg) by nebulization every 2 hours as needed for wheezing or shortness of breath / dyspnea 360 mL 0     ipratropium - albuterol 0.5 mg/2.5 mg/3 mL (DUONEB) 0.5-2.5 (3) MG/3ML neb solution Take 1 vial (3 mLs) by nebulization 4 times daily 360 mL 0     order for DME Equipment being ordered: Nebulizer 1 Units 0     clonazePAM (KLONOPIN) 0.5 MG tablet Take 0.5 tablets (0.25 mg) by mouth 2 times daily as needed for anxiety 30 tablet 0     metoprolol (LOPRESSOR) 25 MG tablet Take 0.5 tablets (12.5 mg) by mouth 2 times daily 60 tablet 1     rosuvastatin (CRESTOR) 5 MG tablet Take 1 tablet (5 mg) by mouth three times a week (Patient taking differently: Take 5 mg by mouth three times a week Monday, Wednesday, Friday) 30 tablet 3     HYDROcodone-acetaminophen (NORCO) 7.5-325 MG per tablet Take 0.5 tablets by mouth 3 times daily as needed for moderate to severe pain Take 0.5 tablets by mouth 3 times daily as needed for moderate to severe pain 45 tablet 0     gabapentin (NEURONTIN) 100 MG capsule Take 1 capsule (100 mg) by mouth 3 times daily 270  capsule 3     hydrALAZINE (APRESOLINE) 25 MG tablet Take 25 mg by mouth 2 times daily  540 tablet 3     calcium 600 MG tablet Take 1 tablet by mouth 2 times daily       apixaban ANTICOAGULANT (ELIQUIS) 2.5 MG tablet Take 1 tablet (2.5 mg) by mouth 2 times daily 60 tablet 11     tamsulosin (FLOMAX) 0.4 MG 24 hr capsule Take 1 capsule (0.4 mg) by mouth daily 90 capsule 3     isosorbide mononitrate (IMDUR) 30 MG 24 hr tablet Take 1 tablet (30 mg) by mouth daily 90 tablet 3     pantoprazole (PROTONIX) 40 MG enteric coated tablet Take 1 tablet (40 mg) by mouth daily Take 30-60 minutes before a meal. 90 tablet 3     amLODIPine (NORVASC) 5 MG tablet Take 1 tablet (5 mg) by mouth daily 90 tablet 3     aspirin 81 MG chewable tablet Take 81 mg by mouth every evening        guaiFENesin (ROBITUSSIN) 100 MG/5ML SYRP Take 5 mLs by mouth every 6 hours as needed for cough        ACETAMINOPHEN PO Take 1,000 mg by mouth 3 times daily as needed for pain       VITAMIN D, CHOLECALCIFEROL, PO Take 2,000 Units by mouth daily        ferrous sulfate 325 (65 FE) MG tablet Take 325 mg by mouth daily       Loperamide HCl (IMODIUM OR) Take 1-2 tablets by mouth daily as needed (for loose stools)        nitroGLYCERIN (NITROSTAT) 0.4 MG SL tablet Place 1 tablet under the tongue every 5 minutes as needed for chest pain. 90 tablet 0       Allergies: Blood transfusion related (informational only); Remeron soltab; Methotrexate; Atorvastatin; Bystolic; Cefuroxime; Clonidine; Relafen; Temazepam; Tramadol; Clindamycin; Indocin; Levaquin; Penicillin g; and Prilosec    PLAN/FOLLOW-UP NEEDED                                                      Previsit review complete.  Patient will see provider at future scheduled appointment.     Urmila Hernandez  General Surgery - Urology RN

## 2017-01-02 NOTE — TELEPHONE ENCOUNTER
Reason for Call:  Home Health Care    Patricia  with [facility] called regarding (reason for call):     Orders are needed for this patient.     PT: eval    OT: eval    Skilled Nursing:  two times a week for five weeks and two PRN's ,    In addition, a Home Health Aide one time a week for five weeks.    Pt Provider: Dr. Konstantin Rabago     Phone Number Homecare Nurse can be reached at: [phone]    Can we leave a detailed message on this number? YES    Best Time: Anytime    Call taken on 1/2/2017 at 11:32 AM by Rex Callahan

## 2017-01-02 NOTE — PROGRESS NOTES
Clinic Care Coordination Contact  OUTREACH    Referral Information:  Referral Source: IP/TCU Report  Reason for Contact: ED visit 12/29/2016  Care Conference: No     Universal Utilization:   ED Visits in last year: 3  Hospital visits in last year: 3  Last PCP appointment: 12/08/16  Missed Appointments: 0  Concerns:  (none this call)  Multiple Providers or Specialists: PCP, Cardiology    Clinical Concerns:  Current Medical Concerns: Pneumonia    Current Behavioral Concerns: member was yelling to/at wife while on the phone. Not sure if this is normal behavior for him    Education Provided to patient: role of CC and when to call   Clinical Pathway Name: None  Clinical Pathway: None    Medication Management:  Current Outpatient Prescriptions   Medication     albuterol (2.5 MG/3ML) 0.083% neb solution     ipratropium - albuterol 0.5 mg/2.5 mg/3 mL (DUONEB) 0.5-2.5 (3) MG/3ML neb solution     order for DME     clonazePAM (KLONOPIN) 0.5 MG tablet     metoprolol (LOPRESSOR) 25 MG tablet     rosuvastatin (CRESTOR) 5 MG tablet     HYDROcodone-acetaminophen (NORCO) 7.5-325 MG per tablet     gabapentin (NEURONTIN) 100 MG capsule     hydrALAZINE (APRESOLINE) 25 MG tablet     calcium 600 MG tablet     apixaban ANTICOAGULANT (ELIQUIS) 2.5 MG tablet     tamsulosin (FLOMAX) 0.4 MG 24 hr capsule     isosorbide mononitrate (IMDUR) 30 MG 24 hr tablet     pantoprazole (PROTONIX) 40 MG enteric coated tablet     amLODIPine (NORVASC) 5 MG tablet     aspirin 81 MG chewable tablet     guaiFENesin (ROBITUSSIN) 100 MG/5ML SYRP     ACETAMINOPHEN PO     VITAMIN D, CHOLECALCIFEROL, PO     ferrous sulfate 325 (65 FE) MG tablet     Loperamide HCl (IMODIUM OR)     nitroGLYCERIN (NITROSTAT) 0.4 MG SL tablet     No current facility-administered medications for this visit.          Functional Status:  Mobility Status: Dependent/Assisted by Another  Equipment Currently Used at Home: wheelchair, power chair  Transportation: Provided by wife       Psychosocial:  Current living arrangement:: I live in a private home with spouse  Financial/Insurance: [facility]     Resources and Interventions:  Current Resources: Home Care; Other (see comment)  PAS Number: [id number]  Senior Linkage Line Referral Placed: 03/29/16  Advanced Care Plans/Directives on file:: Yes  Referrals Placed: Post Acute Facilities    Patient/Caregiver understanding: Spoke briefly with Norma, wife of this member. She stated he was back in the ED for what they still feel is pneumonia. Stated yesterday was a good day but today is not. Continues to receive home care.Member was yelling at his wife throughout the call. She kept the call short. Gave her my phone number, and encouraged to call if needs arise.  Frequency of Care Coordination: as needed  Upcoming appointment:TBD     Plan: Norma will call as questions arise.  This writer will follow home care and engage when home care discharges.    Laura Krishnamurthy RN  FPA Care Coordinator/  Phone: [phone]  Email: [email or web address]

## 2017-01-02 NOTE — TELEPHONE ENCOUNTER
Last office visit: 1018/16    Per standing orders request for PT, OT, skilled nursing, and home health aide has been approved.      RN left a detailed message informing home care nurse of approval.  Instructed to call the clinic if she has any questions.      Caitie Reyes RN

## 2017-01-03 NOTE — PROGRESS NOTES
[facility] now requests orders and shares plan of care/discharge summaries for some patients through [employer].  Please REPLY TO THIS MESSAGE in order to give authorization for orders when needed.  This is considered a verbal order, you will still receive a faxed copy of orders for signature.  Thank you for your assistance in improving collaboration for our patients.    ORDER    OT evaluation completed. Requesting 2 visits this month for toilet safety and urinary incontinence training post catheter removal.

## 2017-01-04 NOTE — PROGRESS NOTES
CC: Urinary retention.    HPI: It is a pleasure to see Mr. Jossue Mendes, a very pleasant 81 year old male with a history of BPH seen today in the urology clinic for evaluation of urinary retention. This developed during recent hospital admission for pneumonia. His primary urologist is Dr. Wang who he sees on an annual basis for BPH but they were unable to get an appointment with him so he presents today for catheter removal/voiding trial. Per review of Dr. Wang's notes, the patient was doing well on a daily regimen of tamsulosin 0.4 mg. His last visit with him was 7/22/16 where he was bladder scanned for 25 cc. Had been in retention once previously but this resolved spontaneously so cystoscopy was deferred.    The patient states he was voiding without any issue prior to recent hospitalization. Denies bothersome hesitancy, intermittency, sense of incomplete emptying. Subjectively, mildly decreased force of stream but it has been this way for years. Charles has been draining well with clear, yellow urine. He is tolerating the catheter without too much issue but would like it removed. Denies gross hematuria, abdominal/back pain, fevers, chills, N/V.    Past Medical History   Diagnosis Date     Abdominal aneurysm (H)      s/p repair (EVAR) 2012     Hypertension      Rheumatoid arthritis(714.0)      previously on meloxicam     S/P AKA (above knee amputation) (H)      rt     Gastro-oesophageal reflux disease      Long term current use of antithrombotics/antiplatelets      81 mg asa     Chronic infection      coccyx wound,healing     CHF (congestive heart failure) (H)      Bruit      carotid endarterectomy     Coronary artery disease      CABG 2012: LIMA to LAD, SVG to PDA and OM2     Glaucoma      Nonsenile cataract      CVA (cerebral infarction)      Atrial fibrillation (H)      Asbestos exposure      COPD (chronic obstructive pulmonary disease) (H)      Renal disease      hx  of dialysis, not presently. CKD stage 3;  10/20/16 had kim catheter but no dx for 6 years     Blind left eye      Diabetes mellitus (H)      Diet controlled (10/16)     Diabetic retinopathy (H)      Past Surgical History   Procedure Laterality Date     Gi surgery       hx of GI tube, not presently     Orthopedic surgery       left grt toe  removed, AKA right, back surgery     Endovascular repair aneurysm abdominal aorta  2/24/2012     Procedure:ENDOVASCULAR REPAIR ANEURYSM ABDOMINAL AORTA; ENDOVASCULAR ABDOMINAL AORTIC ANEURYSM REPAIR ; Surgeon:KARSTEN LARA; Location: OR     Amputation       right below the knee     Vascular surgery       right endarterectomy     Appendectomy       Bypass graft artery coronary  9/28/2012     CABG 2012: LIMA to LAD, SVG to PDA and OM2     Intravitreal injection gas/tpa/methotrexate/antibiotics  5/29/2014     Procedure: INTRAVITREAL INJECTION GAS/TPA/METHOTREXATE/ANTIBIOTICS;  Surgeon: Brendon Catalan MD;  Location:  EC     Esophagoscopy, gastroscopy, duodenoscopy (egd), combined  6/18/2014     Procedure: COMBINED ESOPHAGOSCOPY, GASTROSCOPY, DUODENOSCOPY (EGD), BIOPSY SINGLE OR MULTIPLE;  Surgeon: Kendal Sow MD;  Location:  GI     Coronary angiography adult order  9/2012     Phacoemulsification clear cornea with standard intraocular lens implant Right 10/20/2016     Procedure: PHACOEMULSIFICATION CLEAR CORNEA WITH STANDARD INTRAOCULAR LENS IMPLANT;  Surgeon: Bandar Pleitez MD;  Location:  EC     Keratotomy arcuate with femtosecond laser/imaging for atiol Right 10/20/2016     Procedure: KERATOTOMY ARCUATE WITH FEMTOSECOND LASER/IMAGING FOR ATIOL;  Surgeon: Bandar Pleitez MD;  Location: [facility]     Cataract iol, rt/lt       Current Outpatient Prescriptions   Medication Sig Dispense Refill     albuterol (2.5 MG/3ML) 0.083% neb solution Take 1 vial (2.5 mg) by nebulization every 2 hours as needed for wheezing or shortness of breath / dyspnea 360 mL 0     clonazePAM  (KLONOPIN) 0.5 MG tablet Take 0.5 tablets (0.25 mg) by mouth 2 times daily as needed for anxiety 30 tablet 0     metoprolol (LOPRESSOR) 25 MG tablet Take 0.5 tablets (12.5 mg) by mouth 2 times daily 60 tablet 1     rosuvastatin (CRESTOR) 5 MG tablet Take 1 tablet (5 mg) by mouth three times a week (Patient taking differently: Take 5 mg by mouth three times a week Monday, Wednesday, Friday) 30 tablet 3     HYDROcodone-acetaminophen (NORCO) 7.5-325 MG per tablet Take 0.5 tablets by mouth 3 times daily as needed for moderate to severe pain Take 0.5 tablets by mouth 3 times daily as needed for moderate to severe pain 45 tablet 0     gabapentin (NEURONTIN) 100 MG capsule Take 1 capsule (100 mg) by mouth 3 times daily 270 capsule 3     hydrALAZINE (APRESOLINE) 25 MG tablet Take 25 mg by mouth 2 times daily  540 tablet 3     calcium 600 MG tablet Take 1 tablet by mouth 2 times daily       apixaban ANTICOAGULANT (ELIQUIS) 2.5 MG tablet Take 1 tablet (2.5 mg) by mouth 2 times daily 60 tablet 11     tamsulosin (FLOMAX) 0.4 MG 24 hr capsule Take 1 capsule (0.4 mg) by mouth daily 90 capsule 3     isosorbide mononitrate (IMDUR) 30 MG 24 hr tablet Take 1 tablet (30 mg) by mouth daily 90 tablet 3     pantoprazole (PROTONIX) 40 MG enteric coated tablet Take 1 tablet (40 mg) by mouth daily Take 30-60 minutes before a meal. 90 tablet 3     amLODIPine (NORVASC) 5 MG tablet Take 1 tablet (5 mg) by mouth daily 90 tablet 3     aspirin 81 MG chewable tablet Take 81 mg by mouth every evening        ACETAMINOPHEN PO Take 1,000 mg by mouth 3 times daily as needed for pain       VITAMIN D, CHOLECALCIFEROL, PO Take 2,000 Units by mouth daily        ferrous sulfate 325 (65 FE) MG tablet Take 325 mg by mouth daily       Loperamide HCl (IMODIUM OR) Take 1-2 tablets by mouth daily as needed (for loose stools)        ipratropium - albuterol 0.5 mg/2.5 mg/3 mL (DUONEB) 0.5-2.5 (3) MG/3ML neb solution Take 1 vial (3 mLs) by nebulization 4 times daily  360 mL 0     order for DME Equipment being ordered: Nebulizer 1 Units 0     guaiFENesin (ROBITUSSIN) 100 MG/5ML SYRP Take 5 mLs by mouth every 6 hours as needed for cough        nitroGLYCERIN (NITROSTAT) 0.4 MG SL tablet Place 1 tablet under the tongue every 5 minutes as needed for chest pain. 90 tablet 0     Allergies   Allergen Reactions     Blood Transfusion Related (Informational Only) Other (See Comments)     Patient has a history of a clinically significant antibody against RBC antigens.  A delay in compatible RBCs may occur.     Remeron Soltab      Hostility, very aggresive     Methotrexate      Narcosis of jaw     Atorvastatin      Legs jump     Bystolic [Nebivolol Hcl]      Cefuroxime Other (See Comments)     Weakness       Clonidine      hostility     Relafen [Nabumetone]      Temazepam      Cant sleep     Tramadol Itching     And dizzy     Clindamycin Rash     Back pain     Indocin Rash     Rash and blisters     Levaquin [Levofloxacin] Rash     Penicillin G Rash     Prilosec [Omeprazole Magnesium] Rash     Family History: There is no h/o  malignancy.  There is no h/o urolithiasis.     Social History: The patient formerly smoked cigarettes, no EtOH and no illicit drug use.  He is .    ROS: A comprehensive 14 point ROS was obtained and was positive for recent pneumonia on home oxygen, type 2 diabetes, h/o A. Fib, AAA, CAD, HTN, and otherwise negative except for that outlined above in the HPI.    PHYSICAL EXAM:   Filed Vitals:    01/04/17 1003   BP: 156/70   Pulse: 61   Temp: 96.8  F (36  C)   TempSrc: Oral   SpO2: 92%     GENERAL: Well groomed/well developed/well nourished male in NAD. Wearing oxygen mask and resting comfortably in a wheelchair.  HEENT: EOMI, AT, NC.  SKIN: Warm to touch, dry.  No visible rashes or lesions.  RESP: No increased respiratory effort (on oxygen).  : Charles catheter indwelling draining clear yellow urine.  NEURO: Alert and oriented x 3.  PSYCH: Normal mood and affect,  pleasant and agreeable during interview and exam.    PROCEDURE: A trial of void was performed by nursing staff today in the clinic.  The patient's Charles leg bag was disconnected and 275 cc of sterile water were infused into the patient's bladder via gravity drainage, which the patient tolerated well.  The Charles balloon was decompressed and the catheter was then removed.  After 2 minutes Mr. Mendes voided 100 cc (moderate amount of urine missed the urinal).  Postvoid residual urine volume by ultrasound was measured as 32 cc.  The patient did pass today's trial of void and the catheter did not need to be replaced.      REVIEW OF OUTSIDE RECORDS: 5 minutes spent reviewing previous/outside records.    ASSESSMENT/PLAN:  Mr. Jossue Mendes is a pleasant 81 year old male with a history of BPH who was found to be in urinary retention (600 cc on bladder scan) during recent hospital admission for pneumonia and subsequently had Charles catheter placed. He normally follows with Dr. Wang for history of BPH and had been doing well on a regimen of tamsulosin 0.4 mg daily. The patient denies any issues with voiding prior to his recent hospitalization. As such, a trial of void was performed in clinic today which the patient passed (see above for details).    - Continue to take tamsulosin 0.4 mg daily.   - Instructed to drink plenty of water and call/RTC with any difficulty or inability to void for > 6 hours.   - Instructed to follow up with Dr. Wang within the next month for repeat bladder scan and discuss need for any additional intervention at this time.    I have enjoyed participating in the medical care of this very pleasant patient.  Please don't hesitate to contact me with any questions or concerns.      Daniela Castillo PA-C  Urology

## 2017-01-04 NOTE — NURSING NOTE
"Jossue Mendes's goals for this visit include:   Chief Complaint   Patient presents with     Urinary Problem     concerns with cath.  placed in Hospital a month ago        He requests these members of his care team be copied on today's visit information:     PCP: Konstantin Rabago    Referring Provider:  Referred Self, MD  No address on file    Chief Complaint   Patient presents with     Urinary Problem     concerns with cath.  placed in Hospital a month ago        Initial /70 mmHg  Pulse 61  Temp(Src) 96.8  F (36  C) (Oral)  SpO2 92% Estimated body mass index is 22.19 kg/(m^2) as calculated from the following:    Height as of 12/23/16: 1.753 m (5' 9\").    Weight as of 12/23/16: 68.2 kg (150 lb 5.7 oz).  BP completed using cuff size: regular    Do you need any medication refills at today's visit?     Renetta Fernandes LPN    "

## 2017-01-04 NOTE — NURSING NOTE
Trial void performed as recommended by Daniela Castillo PA-C via verbal order. Instilled 275 ml of normal saline via Charles cath. Pt expressed fullness and urgency.  10cc balloon deflated, catheter removed with out difficulty. Pt voided 100 cc's into urinal with a moderate amount of urine that missed urinal. Post void residual bladder scan was: 32 ml.  Pt tolerated procedure without difficulty. Results verbally reported to Daniela Castillo PA-C. Patient okay to be sent home without catheter.  Teaching done with patient verbally as to where to go or call  if unable to urinate post catheter removal.    Urmila Hernandez  General Surgery - Urology RN

## 2017-01-04 NOTE — PATIENT INSTRUCTIONS
Please contact Dr. Wang's office if unable to void on your own after 8 hours.     Please follow up with Dr. Wang within the next month.

## 2017-01-04 NOTE — MR AVS SNAPSHOT
After Visit Summary   1/4/2017    Jossue Mendes    MRN: [id number]           Patient Information     Date Of Birth          1935        Visit Information        Provider Department      1/4/2017 10:00 AM Daniela Castillo PA-C [facility]        Care Instructions    Please contact Dr. Wang's office if unable to void on your own after 8 hours.     Please follow up with Dr. Wang within the next month.        Follow-ups after your visit        Your next 10 appointments already scheduled     Jan 06, 2017  3:20 PM   Office Visit with Konstantin Rabago MD   [facility]    [address] [address]   [phone]           Bring a current list of meds and any records pertaining to this visit.  For Physicals, please bring immunization records and any forms needing to be filled out.  Please arrive 10 minutes early to complete paperwork.            Jan 18, 2017  8:20 AM   Office Visit with Konstantin Rabago MD   [facility]    [address] [address]   [phone]           Bring a current list of meds and any records pertaining to this visit.  For Physicals, please bring immunization records and any forms needing to be filled out.  Please arrive 10 minutes early to complete paperwork.            Jan 26, 2017  8:30 AM   US AORTA/IVC/ILIAC DUPLEX COMPLETE with SHVUS2   [facility] ([facility])    [address]  [place] [address]   [phone]           Please bring a list of your medicines (including vitamins, minerals and over-the-counter drugs). Also, tell your doctor about any allergies you may have. Wear comfortable clothes and leave your valuables at home.  Adults: No eating or drinking for 8 hours before the exam. You may take medicine with a small sip of  water.  Children: - Children 6+ years: No food or drink for 6 hours before exam. - Children 1-5 years: No food or drink for 4 hours before exam. - Infants, breast-fed: may have breast milk up to 2 hours before exam. - Infants, formula: may have bottle until 4 hours before exam.  Please call the Imaging Department at your exam site with any questions.            Jan 26, 2017  9:30 AM   US CAROTID BILATERAL with SHVUS2   [facility] ([facility])    [address]  [facility] [address]   [phone]           Please bring a list of your medicines (including vitamins, minerals and over-the-counter drugs). Also, tell your doctor about any allergies you may have. Wear comfortable clothes and leave your valuables at home.  You do not need to do anything special to prepare for your exam.  Please call the Imaging Department at your exam site with any questions.            Jan 26, 2017 10:30 AM   Return Visit with Rj Quan MD   [facility] ([facility])    [address]  [facility] [address] [phone]            Jan 26, 2017  1:00 PM   [facility] with [facility] Jenkins [facility]   [facility]    [address] [place] [id number] [phone]            Mar 08, 2017 10:50 AM   Return Visit with MIREYA Ulloa CNP   [facility] ([facility])    [address] [place] [address] [address]  [facility] [id number] [phone]              Who to contact     If you have questions or need follow up information about today's clinic visit or your schedule please contact [facility] directly at [phone].  Normal or non-critical lab and imaging results will be communicated to you by MyChart, letter or phone within 4 business days after  the clinic has received the results. If you do not hear from us within 7 days, please contact the clinic through Win[employer] or phone. If you have a critical or abnormal lab result, we will notify you by phone as soon as possible.  Submit refill requests through Win[employer] or call your pharmacy and they will forward the refill request to us. Please allow 3 business days for your refill to be completed.          Additional Information About Your Visit        WinProbe Information     WinPro[employer] is an electronic gateway that provides easy, online access to your medical records. With WinProbe, you can request a clinic appointment, read your test results, renew a prescription or communicate with your care team.     To sign up for WinProbe visit the website at [email or web address]   You will be asked to enter the access code listed below, as well as some personal information. Please follow the directions to create your username and password.     Your access code is: [email or web address]  Expires: 2017 11:57 AM     Your access code will  in 90 days. If you need help or a new code, please contact your [facility] or call [phone] for assistance.        Care EveryWhere ID     This is your Care EveryWhere ID. This could be used by other organizations to access your [facility] medical records  [id number]        Your Vitals Were     Pulse Temperature Pulse Oximetry             61 96.8  F (36  C) (Oral) 92%          Blood Pressure from Last 3 Encounters:   17 156/70   16 132/77   16 142/51    Weight from Last 3 Encounters:   16 68.2 kg (150 lb 5.7 oz)   16 67.586 kg (149 lb)   16 72.576 kg (160 lb)              Today, you had the following     No orders found for display         Today's Medication Changes          These changes are accurate as of: 17 10:53 AM.  If you have any questions, ask your nurse or doctor.               These medicines have changed or  have updated prescriptions.        Dose/Directions    rosuvastatin 5 MG tablet   Commonly known as:  CRESTOR   This may have changed:  additional instructions   Used for:  Cardiomyopathy (H)        Dose:  5 mg   Take 1 tablet (5 mg) by mouth three times a week   Quantity:  30 tablet   Refills:  3                Primary Care Provider Office Phone # Fax #    Konstantin Rabago [phone]       [facility] [address]  [facility] [address]        Thank you!     Thank you for choosing [facility]  for your care. Our goal is always to provide you with excellent care. Hearing back from our patients is one way we can continue to improve our services. Please take a few minutes to complete the written survey that you may receive in the mail after your visit with us. Thank you!             Your Updated Medication List - Protect others around you: Learn how to safely use, store and throw away your medicines at www.disposemymeds.org.          This list is accurate as of: 1/4/17 10:53 AM.  Always use your most recent med list.                   Brand Name Dispense Instructions for use    ACETAMINOPHEN PO      Take 1,000 mg by mouth 3 times daily as needed for pain       albuterol (2.5 MG/3ML) 0.083% neb solution     360 mL    Take 1 vial (2.5 mg) by nebulization every 2 hours as needed for wheezing or shortness of breath / dyspnea       amLODIPine 5 MG tablet    NORVASC    90 tablet    Take 1 tablet (5 mg) by mouth daily       apixaban ANTICOAGULANT 2.5 MG tablet    ELIQUIS    60 tablet    Take 1 tablet (2.5 mg) by mouth 2 times daily       aspirin 81 MG chewable tablet      Take 81 mg by mouth every evening       calcium 600 MG tablet      Take 1 tablet by mouth 2 times daily       clonazePAM 0.5 MG tablet    klonoPIN    30 tablet    Take 0.5 tablets (0.25 mg) by mouth 2 times daily as needed for anxiety       ferrous sulfate 325 (65 FE) MG tablet    IRON     Take 325 mg by mouth  daily       gabapentin 100 MG capsule    NEURONTIN    270 capsule    Take 1 capsule (100 mg) by mouth 3 times daily       guaiFENesin 100 MG/5ML Syrp    ROBITUSSIN     Take 5 mLs by mouth every 6 hours as needed for cough       hydrALAZINE 25 MG tablet    APRESOLINE    540 tablet    Take 25 mg by mouth 2 times daily       HYDROcodone-acetaminophen 7.5-325 MG per tablet    NORCO    45 tablet    Take 0.5 tablets by mouth 3 times daily as needed for moderate to severe pain Take 0.5 tablets by mouth 3 times daily as needed for moderate to severe pain       IMODIUM OR      Take 1-2 tablets by mouth daily as needed (for loose stools)       ipratropium - albuterol 0.5 mg/2.5 mg/3 mL 0.5-2.5 (3) MG/3ML neb solution    DUONEB    360 mL    Take 1 vial (3 mLs) by nebulization 4 times daily       isosorbide mononitrate 30 MG 24 hr tablet    IMDUR    90 tablet    Take 1 tablet (30 mg) by mouth daily       metoprolol 25 MG tablet    LOPRESSOR    60 tablet    Take 0.5 tablets (12.5 mg) by mouth 2 times daily       nitroglycerin 0.4 MG sublingual tablet    NITROSTAT    90 tablet    Place 1 tablet under the tongue every 5 minutes as needed for chest pain.       order for DME     1 Units    Equipment being ordered: Nebulizer       pantoprazole 40 MG EC tablet    PROTONIX    90 tablet    Take 1 tablet (40 mg) by mouth daily Take 30-60 minutes before a meal.       rosuvastatin 5 MG tablet    CRESTOR    30 tablet    Take 1 tablet (5 mg) by mouth three times a week       tamsulosin 0.4 MG capsule    FLOMAX    90 capsule    Take 1 capsule (0.4 mg) by mouth daily       VITAMIN D (CHOLECALCIFEROL) PO      Take 2,000 Units by mouth daily

## 2017-01-05 NOTE — TELEPHONE ENCOUNTER
Noted that VSS. If continues weakness does not improve, patient should be seen.    Konstantin Rabago MD

## 2017-01-05 NOTE — TELEPHONE ENCOUNTER
FYI:  Pt was seen today as wife was concerned with his weak condition  And said it was hard for him to sit up by himself    Vitals were ok  Temp 97.3  Pulse 72  resp 18  /72  O2 93% on one liter of O2  No edema lung clear    Call if you need any further information  Ok to leave voicemail  KRYSTIN Santos (HOME CARE) [phone]

## 2017-01-06 PROBLEM — R53.1 WEAKNESS: Status: ACTIVE | Noted: 2017-01-01

## 2017-01-06 NOTE — LETTER
Hand-off for Care Transitions to Next Level of Care Provider  Name: Jossue Mendes  MRN #: [id number]    Primary Care Provider: Konstantin Rabago MD  Primary Clinic: [facility] [address]  [facility] [address]  Primary Care Clinic Name: [facility]  Primary Care MD Name: Dr. Konstantin Rabago  Reason for Hospitalization:  Weakness [R53.1]  Admit Date/Time: 1/6/2017 11:51 AM  Discharge Date: 1/9/2017      Reason for Communication Hand-off Referral: Other Discharging to TCU    Discharge Plan:  Discharged to: TCU: [facility]                       Follow-up plan:  Future Appointments  Date Time Provider Department Center   1/17/2017 10:45 AM Tani Wang MD [facility]   1/18/2017 8:20 AM Konstantin Rabago MD [facility]   1/26/2017 8:30 AM SHVUS2 [facility]   1/26/2017 9:30 AM SHVUS2 [facility]   1/26/2017 10:30 AM Rj Quan MD [facility]   1/26/2017 1:00 PM Nelia Jenkins MD [facility] [place]   3/8/2017 10:50 AM [facility]

## 2017-01-06 NOTE — ED AVS SNAPSHOT
` `     [facility]: [phone]            Medication Administration Report for Jossue Mendes as of 01/09/17 1058   Legend:    Given Hold Not Given Due Canceled Entry Other Actions    Time Time (Time) Time  Time-Action       Inactive    Active    Linked        Medications 01/03/17 01/04/17 01/05/17 01/06/17 01/07/17 01/08/17 01/09/17    acetaminophen (TYLENOL) tablet 1,000 mg  Dose: 1,000 mg Freq: EVERY 8 HOURS PRN Route: PO  PRN Reason: other  PRN Comment: pain  Start: 01/06/17 1607   Admin Instructions: Maximum acetaminophen dose from all sources = 75 mg/kg/day not to exceed 4 gram        1751 (1,000 mg)-Given        0336 (1,000 mg)-Given       1007 (1,000 mg)-Given [C]         1032 (1,000 mg)-Given           albuterol neb solution 2.5 mg  Dose: 3 mL Freq: EVERY 2 HOURS PRN Route: NEBULIZATION  PRN Reasons: wheezing,shortness of breath / dyspnea  Start: 01/06/17 1607              amLODIPine (NORVASC) tablet 5 mg  Dose: 5 mg Freq: DAILY Route: PO  Start: 01/07/17 0800        0848 (5 mg)-Given        0822 (5 mg)-Given        0757 (5 mg)-Given           apixaban ANTICOAGULANT (ELIQUIS) tablet 2.5 mg  Dose: 2.5 mg Freq: 2 TIMES DAILY Route: PO  Start: 01/06/17 2000       2151 (2.5 mg)-Given        0848 (2.5 mg)-Given       1950 (2.5 mg)-Given        0822 (2.5 mg)-Given       2108 (2.5 mg)-Given        0755 (2.5 mg)-Given       [ ] 2000           aspirin chewable tablet 81 mg  Dose: 81 mg Freq: EVERY EVENING Route: PO  Start: 01/06/17 2000       2150 (81 mg)-Given        1950 (81 mg)-Given        2109 (81 mg)-Given        [ ] 2000           bisacodyl (DULCOLAX) Suppository 10 mg  Dose: 10 mg Freq: DAILY PRN Route: RE  PRN Reason: constipation  Start: 01/06/17 1607   Admin Instructions: Hold for loose stools.  This is the third step of a three step constipation treatment protocol.               ferrous sulfate (IRON) tablet 325 mg  Dose: 325 mg Freq: DAILY Route: PO  Start: 01/07/17 0800   Admin  Instructions: Absorbed best on an empty stomach. If stomach upset occurs, can take with meals.         0848 (325 mg)-Given        0822 (325 mg)-Given        0755 (325 mg)-Given           gabapentin (NEURONTIN) capsule 100 mg  Dose: 100 mg Freq: 3 TIMES DAILY Route: PO  Start: 01/06/17 2000       2150 (100 mg)-Given        0845 (100 mg)-Given       1425 (100 mg)-Given       1950 (100 mg)-Given        0822 (100 mg)-Given       1401 (100 mg)-Given       2109 (100 mg)-Given        0757 (100 mg)-Given       [ ] 1400       [ ] 2000           glucose 40 % gel 15-30 g  Dose: 15-30 g Freq: EVERY 15 MIN PRN Route: PO  PRN Reason: low blood sugar  Start: 01/06/17 1924   Admin Instructions: Give 15 g for BG 51 to 69 mg/dL IF patient is conscious and able to swallow. Give 30 g for BG less than or equal to 50 mg/dL IF patient is conscious and able to swallow. Do NOT give glucose gel via enteral tube.  IF patient has enteral tube: give apple juice 120 mL (4 oz or 15 g of CHO) via enteral tube for BG 51 to 69 mg/dL.  Give apple juice 240 mL (8 oz or 30 g of CHO) via enteral tube for BG less than or equal to 50 mg/dL.              Or  dextrose 50 % injection 25-50 mL  Dose: 25-50 mL Freq: EVERY 15 MIN PRN Route: IV  PRN Reason: low blood sugar  Start: 01/06/17 1924   Admin Instructions: Use if have IV access, BG less than 70 mg/dL and meet dose criteria below:  Dose if conscious and alert (or disorientated) and NPO = 25 mL  Dose if unconscious / not alert = 50 mL              Or  glucagon injection 1 mg  Dose: 1 mg Freq: EVERY 15 MIN PRN Route: SC  PRN Reason: low blood sugar  PRN Comment: May repeat x 1 only  Start: 01/06/17 1924   Admin Instructions: May give SQ or IM. IF BG less than or equal to 50 mg/dL and no IV access.  ONLY use glucagon IF patient has NO IV access AND is UNABLE to swallow.               guaiFENesin (ROBITUSSIN) 20 mg/mL solution 5 mL  Dose: 5 mL Freq: EVERY 6 HOURS PRN Route: PO  PRN Reason: cough  Start:  01/06/17 1607              hydrALAZINE (APRESOLINE) tablet 25 mg  Dose: 25 mg Freq: 2 TIMES DAILY Route: PO  Start: 01/06/17 2000       (2151)-Not Given [C]        1007 (25 mg)-Given       1950 (25 mg)-Given        0822 (25 mg)-Given       2109 (25 mg)-Given        0757 (25 mg)-Given       [ ] 2000           HYDROcodone-acetaminophen (LORTAB) 7.5-325 MG/15ML solution 7.5 mL  Dose: 7.5 mL Freq: 3 TIMES DAILY PRN Route: PO  PRN Reason: moderate to severe pain  Start: 01/07/17 1040   Admin Instructions: Max of 90 ml/24 hours.  Maximum acetaminophen dose from all sources= 75 mg/kg/day not to exceed 4 grams.         1902 (7.5 mL)-Given        0924 (7.5 mL)-Given       1315 (7.5 mL)-Given        0854 (7.5 mL)-Given           insulin aspart (NovoLOG) inj (RAPID ACTING)  Dose: 1-5 Units Freq: AT BEDTIME Route: SC  Start: 01/06/17 2200   Admin Instructions: MEDIUM INSULIN RESISTANCE DOSING    Do Not give Bedtime Correction Insulin if BG less than  200.   For  - 249 give 1 units.   For  - 299 give 2 units.   For  - 349 give 3 units.   For  -399 give 4 units.   For BG greater than or equal to 400 give 5 units.  Notify provider if glucose greater than or equal to 350 mg/dL after administration of correction dose.  If given at mealtime, must be administered 5 min before meal or immediately after.        (2221)-Not Given        (2206)-Not Given        (2148)-Not Given        [ ] 2200           insulin aspart (NovoLOG) inj (RAPID ACTING)  Dose: 1-7 Units Freq: 3 TIMES DAILY BEFORE MEALS Route: SC  Start: 01/07/17 0730   Admin Instructions: Correction Scale - MEDIUM INSULIN RESISTANCE DOSING     Do Not give Correction Insulin if Pre-Meal BG less than 140.   For Pre-Meal  - 189 give 1 unit.   For Pre-Meal  - 239 give 2 units.   For Pre-Meal  - 289 give 3 units.   For Pre-Meal  - 339 give 4 units.   For Pre-Meal - 399 give 5 units.   For Pre-Meal -449 give 6 units  For  "Pre-Meal BG greater than or equal to 450 give 7 units.   To be given with prandial insulin, and based on pre-meal blood glucose.    Notify provider if glucose greater than or equal to 350 mg/dL after administration of correction dose.  If given at mealtime, must be administered 5 min before meal or immediately after.         (0827)-Not Given [C]       (1331)-Not Given [C]       (1823)-Not Given [C]        (0820)-Not Given [C]       (1202)-Not Given [C]       (1700)-Not Given [C]        (0811)-Not Given       [ ] 1200       [ ] 1700           ipratropium - albuterol 0.5 mg/2.5 mg/3 mL (DUONEB) neb solution 3 mL  Dose: 3 mL Freq: 4 TIMES DAILY Route: NEBULIZATION  Start: 01/06/17 1608       1636 (3 mL)-Given       (2108)-Not Given [C]        0710 (3 mL)-Given       1110 (3 mL)-Given       1623 (3 mL)-Given       (2029)-Not Given        0811 (3 mL)-Given       1255 (3 mL)-Given       1636 (3 mL)-Given       (1934)-Not Given        0858 (3 mL)-Given       [ ] 1100       [ ] 1500       [ ] 1900           isosorbide mononitrate (IMDUR) 24 hr tablet 30 mg  Dose: 30 mg Freq: DAILY Route: PO  Start: 01/07/17 0800   Admin Instructions: DO NOT CRUSH. Can split tablet in half along score irena.         0847 (30 mg)-Given        0822 (30 mg)-Given        0757 (30 mg)-Given           lidocaine (LMX4) cream  Freq: EVERY 1 HOUR PRN Route: Top  PRN Reason: pain  PRN Comment: with VAD insertion or accessing implanted port.  Start: 01/06/17 1607   Admin Instructions: Do NOT give if patient has a history of allergy to any local anesthetic or any \"dayron\" product.  Apply 30 minutes prior to VAD insertion or port access. MAX Dose: 2.5 g (  of 5 g tube).               lidocaine (LMX4) cream  Freq: EVERY 1 HOUR PRN Route: Top  PRN Reason: pain  PRN Comment: with VAD insertion or accessing implanted port,  Start: 01/06/17 1301   Admin Instructions: Do NOT give if patient has a history of allergy to any local anesthetic or any \"dayron\" product. " "  Apply 30 minutes prior to VAD insertion or port access.  MAX Dose:  2.5 g (  of 5 g tube)               lidocaine 1 % 1 mL  Dose: 1 mL Freq: EVERY 1 HOUR PRN Route: OTHER  PRN Comment: mild pain with VAD insertion or accessing implanted port  Start: 01/06/17 1607   Admin Instructions: Do NOT give if patient has a history of allergy to any local anesthetic or any \"dayron\" product. MAX dose 1 mL subcutaneous OR intradermal in divided doses.               lidocaine 1 % 1 mL  Dose: 1 mL Freq: EVERY 1 HOUR PRN Route: OTHER  PRN Comment: mild pain with VAD insertion or accessing implanted port,  Start: 01/06/17 1301   Admin Instructions: Do NOT give if patient has a history of allergy to any local anesthetic or any \"dayron\" product. MAX dose 1 mL subcutaneous OR intradermal in divided doses.               metoprolol (LOPRESSOR) half-tab 12.5 mg  Dose: 12.5 mg Freq: 2 TIMES DAILY Route: PO  Start: 01/06/17 2000       2150 (12.5 mg)-Given        0845 (12.5 mg)-Given       1949 (12.5 mg)-Given        0821 (12.5 mg)-Given       2108 (12.5 mg)-Given        0755 (12.5 mg)-Given       [ ] 2000           naloxone (NARCAN) injection 0.1-0.4 mg  Dose: 0.1-0.4 mg Freq: EVERY 2 MIN PRN Route: IV  PRN Reason: opioid reversal  Start: 01/06/17 1607   Admin Instructions: For respiratory rate LESS than or EQUAL to 8.  Partial reversal dose:  0.1 mg titrated q 2 minutes for Analgesia Side Effects Monitoring Sedation Level of 3 (frequently drowsy, arousable, drifts to sleep during conversation).Full reversal dose:  0.4 mg bolus for Analgesia Side Effects Monitoring Sedation Level of 4 (somnolent, minimal or no response to stimulation).               ondansetron (ZOFRAN-ODT) ODT tab 4 mg  Dose: 4 mg Freq: EVERY 6 HOURS PRN Route: PO  PRN Reason: nausea  Start: 01/06/17 1607   Admin Instructions: This is Step 1 of nausea and vomiting management.  If nausea not resolved in 15 minutes, go to Step 2 prochlorperazine (COMPAZINE). Do not push " through foil backing. Peel back foil and gently remove. Place on tongue immediately. Administration with liquid unnecessary              Or  ondansetron (ZOFRAN) injection 4 mg  Dose: 4 mg Freq: EVERY 6 HOURS PRN Route: IV  PRN Reasons: nausea,vomiting  Start: 01/06/17 1607   Admin Instructions: This is Step 1 of nausea and vomiting management.  If nausea not resolved in 15 minutes, go to Step 2 prochlorperazine (COMPAZINE).               pantoprazole (PROTONIX) EC tablet 40 mg  Dose: 40 mg Freq: DAILY Route: PO  Start: 01/07/17 0800   Admin Instructions: DO NOT CRUSH.         0845 (40 mg)-Given        0822 (40 mg)-Given        0757 (40 mg)-Given           polyethylene glycol (MIRALAX/GLYCOLAX) Packet 17 g  Dose: 17 g Freq: DAILY PRN Route: PO  PRN Reason: constipation  Start: 01/06/17 1607   Admin Instructions: Give in 8oz of  water, juice, or soda. Hold for loose stools.  This is the second step of a three step constipation treatment protocol.  1 Packet = 17 grams. Mixed prescribed dose in 8 ounces of water. Follow with 8 oz. of water.               prochlorperazine (COMPAZINE) injection 5 mg  Dose: 5 mg Freq: EVERY 6 HOURS PRN Route: IV  PRN Reasons: nausea,vomiting  Start: 01/06/17 1607   Admin Instructions: This is Step 2 of nausea and vomiting management.   If nausea not resolved in 15 minutes, give metoclopramide (REGLAN) if ordered (step 3 of nausea and vomiting management)              Or  prochlorperazine (COMPAZINE) tablet 5 mg  Dose: 5 mg Freq: EVERY 6 HOURS PRN Route: PO  PRN Reason: vomiting  Start: 01/06/17 1607   Admin Instructions: This is Step 2 of nausea and vomiting management.   If nausea not resolved in 15 minutes, give metoclopramide (REGLAN) if ordered (step 3 of nausea and vomiting management)              Or  prochlorperazine (COMPAZINE) Suppository 12.5 mg  Dose: 12.5 mg Freq: EVERY 12 HOURS PRN Route: RE  PRN Reasons: nausea,vomiting  Start: 01/06/17 1607   Admin Instructions: This is  Step 2 of nausea and vomiting management.   If nausea not resolved in 15 minutes, give metoclopramide (REGLAN) if ordered (step 3 of nausea and vomiting management)               rosuvastatin (CRESTOR) tablet 5 mg  Dose: 5 mg Freq: Once per day on Mon Wed Fri Route: PO  Start: 01/06/17 2000 2227 (5 mg)-Given          [ ] 2000           senna-docusate (SENOKOT-S;PERICOLACE) 8.6-50 MG per tablet 1-2 tablet  Dose: 1-2 tablet Freq: 2 TIMES DAILY PRN Route: PO  PRN Comment: constipation   Start: 01/06/17 1607   Admin Instructions: If no bowel movement in 24 hours, increase to 2 tablets PO BID.  Hold for loose stools.   This is the first step of a three step constipation treatment protocol.         1425 (1 tablet)-Given             sodium chloride (PF) 0.9% PF flush 3 mL  Dose: 3 mL Freq: EVERY 8 HOURS Route: IK  Start: 01/06/17 1608   Admin Instructions: And Q1H PRN, to lock peripheral IV dormant line.                (0206)-Not Given       0856 (3 mL)-Given                      0823 (3 mL)-Given               (0132)-Not Given [C]       (1055)-Not Given       [ ] 1608           sodium chloride (PF) 0.9% PF flush 3 mL  Dose: 3 mL Freq: EVERY 1 HOUR PRN Route: IK  PRN Reason: line flush  Start: 01/06/17 1607   Admin Instructions: for peripheral IV flush post IV meds               sodium chloride (PF) 0.9% PF flush 3 mL  Dose: 3 mL Freq: EVERY 8 HOURS Route: IK  Start: 01/06/17 1303   Admin Instructions: And Q1H PRN, to lock peripheral IV dormant line.               (2142)-Not Given                              (0622)-Not Given [C]       1203 (3 mL)-Given                      [ ] 1303       [ ] 2103           sodium chloride (PF) 0.9% PF flush 3 mL  Dose: 3 mL Freq: EVERY 1 HOUR PRN Route: IK  PRN Reason: line flush  Start: 01/06/17 1301   Admin Instructions: for peripheral IV flush post IV meds               tamsulosin (FLOMAX) capsule 0.4 mg  Dose: 0.4 mg Freq: DAILY Route: PO  Start: 01/07/17 0800   Admin  Instructions: Administer 30 minutes after the same meal each day.  Capsules should be swallowed whole; do not crush chew or open.         1007 (0.4 mg)-Given        0822 (0.4 mg)-Given        0757 (0.4 mg)-Given          Completed Medications  Medications 01/03/17 01/04/17 01/05/17 01/06/17 01/07/17 01/08/17 01/09/17         Dose: 10-20 mL Freq: ONCE Route: UR  Start: 01/07/17 1525   End: 01/07/17 1542   Admin Instructions: Into penis for catheter insertion         1542 (10 mL)-Given            Discontinued Medications  Medications 01/03/17 01/04/17 01/05/17 01/06/17 01/07/17 01/08/17 01/09/17         Dose: 5 mg Freq: EVERY 4 HOURS PRN Route: PO  PRN Reason: moderate to severe pain  Start: 01/06/17 1607   End: 01/07/17 1038   Admin Instructions: If age greater than 65 use lower dose for first time use.        1751 (5 mg)-Given        0057 (5 mg)-Given       0537 (5 mg)-Given       1038-Med Discontinued

## 2017-01-06 NOTE — ED AVS SNAPSHOT
MRN:[id number]                      After Visit Summary   1/6/2017    Jossue Mendes    MRN: [id number]           Thank you!     Thank you for choosing [facility] for your care. Our goal is always to provide you with excellent care. Hearing back from our patients is one way we can continue to improve our services. Please take a few minutes to complete the written survey that you may receive in the mail after you visit with us. Thank you!        Patient Information     Date Of Birth          1935        About your hospital stay     You were admitted on:  January 6, 2017 You last received care in the:  [facility]    You were discharged on:  January 9, 2017        Reason for your hospital stay       You were admitted due to worsening weakness at home.  You were found to have a low hemoglobin level and received two units of blood with improvement.  You were also found to be very weak and recommended to discharge to a rehab facility before going home.                  Who to Call     For medical emergencies, please call 911.  For non-urgent questions about your medical care, please call your primary care provider or clinic, [phone]          Attending Provider     Provider    Kevin Orozco MD    Quan, Juan Muro MD       Primary Care Provider Office Phone # Fax #    Konstantin Rabago [phone]       [facility] [address] [facility] [address]        After Care Instructions     Activity - Up with nursing assistance           Advance Diet as Tolerated       Follow this diet upon discharge: Orders Placed This Encounter  Combination Diet Regular Diet Adult; Renal Diet            Bladder scan       X 2 for post void residual, straight cath per protocol            Fall precautions           General info for SNF       Length of Stay Estimate: Short Term Care: Estimated # of Days <30  Condition at Discharge: Stable  Level of care:skilled    Rehabilitation Potential: Good  Admission H&P remains valid and up-to-date: Yes  Recent Chemotherapy: N/A  Use Nursing Home Standing Orders: Yes            Mantoux instructions       Give two-step Mantoux (PPD) Per Facility Policy Yes            Oxygen - Nasal cannula       1 Lpm by nasal cannula to keep O2 sats 92% or greater.            Oxygen - Nasal cannula       1 Lpm by nasal cannula to keep O2 sats 92% or greater. Has home O2.                  Follow-up Appointments     Follow Up and recommended labs and tests       Follow up with primary care provider in 1-2 weeks.  The following labs/tests are recommended: repeat Hemoglobin.  Follow up with specialist, Urology, in 1-2 weeks.  No follow up labs or test are needed.            Follow Up and recommended labs and tests       You are scheduled to see Dr. Wang on Tuesday, January 17th at 10:45 AM.  This appointment is at the [facility] in the [address] [place] [address].  [address] [place], [place] [address] [address]  [phone]                  Your next 10 appointments already scheduled     Jan 17, 2017 10:45 AM   New Visit with Tani Wang MD   [facility] ([address] [address]   [phone]            Jan 18, 2017  8:20 AM   Office Visit with Konstantin Rabago MD   [facility] ([facility])    [address] [place] [id number] [phone]           Bring a current list of meds and any records pertaining to this visit.  For Physicals, please bring immunization records and any forms needing to be filled out.  Please arrive 10 minutes early to complete paperwork.            Jan 26, 2017  8:30 AM   US AORTA/IVC/ILIAC DUPLEX COMPLETE with SHVUS2   [facility] ([facility])    [address] [place] [address] [address]  [place] [address]   [phone]           Please bring a list of your  medicines (including vitamins, minerals and over-the-counter drugs). Also, tell your doctor about any allergies you may have. Wear comfortable clothes and leave your valuables at home.  Adults: No eating or drinking for 8 hours before the exam. You may take medicine with a small sip of water.  Children: - Children 6+ years: No food or drink for 6 hours before exam. - Children 1-5 years: No food or drink for 4 hours before exam. - Infants, breast-fed: may have breast milk up to 2 hours before exam. - Infants, formula: may have bottle until 4 hours before exam.  Please call the Imaging Department at your exam site with any questions.            Jan 26, 2017  9:30 AM   US CAROTID BILATERAL with SHVUS2   [facility] ([facility])    [address]  [facility] [address]   [phone]           Please bring a list of your medicines (including vitamins, minerals and over-the-counter drugs). Also, tell your doctor about any allergies you may have. Wear comfortable clothes and leave your valuables at home.  You do not need to do anything special to prepare for your exam.  Please call the Imaging Department at your exam site with any questions.            Jan 26, 2017 10:30 AM   Return Visit with Rj Quan MD   [facility] ([facility])    [address]  [facility] [address] [phone]            Jan 26, 2017  1:00 PM   Return Visit with Nelia Jenkins MD   [facility] ([facility])    [address] [place] [address]   [phone]            Mar 08, 2017 10:50 AM   Return Visit with MIREYA [facility] ([facility])    [address] [facility] [address]  [facility] [address] [phone]              Additional Services      "Occupational Therapy Adult Consult       Evaluate and treat as clinically indicated.    Reason:  Generalized weakness            Physical Therapy Adult Consult       Evaluate and treat as clinically indicated.    Reason:  Generalized weakness                  Further instructions from your care team       CM  You are discharging to:    [facility]  [address] [facility], [place]        Pending Results     No orders found from 2017 to 2017.            Statement of Approval     Ordered          17 0857  I have reviewed and agree with all the recommendations and orders detailed in this document.   EFFECTIVE NOW     Approved and electronically signed by:  Ping Hunt PA-C             Admission Information        Provider Department Dept Phone    2017 Juan Quan MD [facility] [phone]      Your Vitals Were     Blood Pressure Temperature Respirations    151/65 mmHg 97.2  F (36.2  C) (Oral) 16    Height Weight BMI (Body Mass Index)    1.753 m (5' 9.02\") 62.5 kg (137 lb 12.6 oz) 20.34 kg/m2    Pulse Oximetry          96%        [employer] Information     [employer] lets you send messages to your doctor, view your test results, renew your prescriptions, schedule appointments and more. To sign up, go to [email or web address] . Click on \"Log in\" on the left side of the screen, which will take you to the Welcome page. Then click on \"Sign up Now\" on the right side of the page.     You will be asked to enter the access code listed below, as well as some personal information. Please follow the directions to create your username and password.     Your access code is: [email or web address]  Expires: 2017 11:57 AM     Your access code will  in 90 days. If you need help or a new code, please call your [facility] clinic or [phone].        Care EveryWhere ID     This is your Care EveryWhere ID. This could be used by other organizations to access your [facility] medical " records  [id number]           Review of your medicines      CONTINUE these medicines which have NOT CHANGED        Dose / Directions    ACETAMINOPHEN PO        Dose:  1000 mg   Take 1,000 mg by mouth 3 times daily as needed for pain   Refills:  0       albuterol (2.5 MG/3ML) 0.083% neb solution   Used for:  Pneumonia of right lower lobe due to infectious organism        Dose:  3 mL   Take 1 vial (2.5 mg) by nebulization every 2 hours as needed for wheezing or shortness of breath / dyspnea   Quantity:  360 mL   Refills:  0       amLODIPine 5 MG tablet   Commonly known as:  NORVASC   Used for:  Hypertension        Dose:  5 mg   Take 1 tablet (5 mg) by mouth daily   Quantity:  90 tablet   Refills:  3       apixaban ANTICOAGULANT 2.5 MG tablet   Commonly known as:  ELIQUIS   Used for:  Paroxysmal atrial fibrillation (H)        Dose:  2.5 mg   Take 1 tablet (2.5 mg) by mouth 2 times daily   Quantity:  60 tablet   Refills:  11       aspirin 81 MG chewable tablet        Dose:  81 mg   Take 81 mg by mouth every evening   Refills:  0       calcium 600 MG tablet        Dose:  1 tablet   Take 1 tablet by mouth 2 times daily   Refills:  0       clonazePAM 0.5 MG tablet   Commonly known as:  klonoPIN   Used for:  Anxiety        Dose:  0.25 mg   Take 0.5 tablets (0.25 mg) by mouth 2 times daily as needed for anxiety   Quantity:  30 tablet   Refills:  0       CRESTOR PO        Dose:  5 mg   Take 5 mg by mouth Mon, Wed, Fri evening   Refills:  0       ferrous sulfate 325 (65 FE) MG tablet   Commonly known as:  IRON        Dose:  325 mg   Take 325 mg by mouth daily   Refills:  0       gabapentin 100 MG capsule   Commonly known as:  NEURONTIN   Used for:  Diabetic polyneuropathy associated with type 2 diabetes mellitus (H)        Dose:  100 mg   Take 1 capsule (100 mg) by mouth 3 times daily   Quantity:  270 capsule   Refills:  3       guaiFENesin 100 MG/5ML Syrp   Commonly known as:  ROBITUSSIN        Dose:  5 mL   Take 5 mLs by  mouth every 6 hours as needed for cough   Refills:  0       hydrALAZINE 25 MG tablet   Commonly known as:  APRESOLINE   Used for:  Cardiomyopathy (H), Benign essential hypertension        Dose:  25 mg   Take 25 mg by mouth 2 times daily   Quantity:  540 tablet   Refills:  3       HYDROcodone-acetaminophen 7.5-325 MG per tablet   Commonly known as:  NORCO   Used for:  Pain        Dose:  0.5 tablet   Take 0.5 tablets by mouth 3 times daily as needed for moderate to severe pain Take 0.5 tablets by mouth 3 times daily as needed for moderate to severe pain   Quantity:  45 tablet   Refills:  0       IMODIUM OR        Dose:  1-2 tablet   Take 1-2 tablets by mouth daily as needed (for loose stools)   Refills:  0       ipratropium - albuterol 0.5 mg/2.5 mg/3 mL 0.5-2.5 (3) MG/3ML neb solution   Commonly known as:  DUONEB   Used for:  Pneumonia of right lower lobe due to infectious organism        Dose:  3 mL   Take 1 vial (3 mLs) by nebulization 4 times daily   Quantity:  360 mL   Refills:  0       isosorbide mononitrate 30 MG 24 hr tablet   Commonly known as:  IMDUR   Used for:  Cardiomyopathy (H)        Dose:  30 mg   Take 1 tablet (30 mg) by mouth daily   Quantity:  90 tablet   Refills:  3       metoprolol 25 MG tablet   Commonly known as:  LOPRESSOR   Used for:  Cardiomyopathy (H)        Dose:  12.5 mg   Take 0.5 tablets (12.5 mg) by mouth 2 times daily   Quantity:  60 tablet   Refills:  1       nitroglycerin 0.4 MG sublingual tablet   Commonly known as:  NITROSTAT   Used for:  CAD (coronary artery disease)        Dose:  0.4 mg   Place 1 tablet under the tongue every 5 minutes as needed for chest pain.   Quantity:  90 tablet   Refills:  0       order for DME   Used for:  Pneumonia of right lower lobe due to infectious organism        Equipment being ordered: Nebulizer   Quantity:  1 Units   Refills:  0       pantoprazole 40 MG EC tablet   Commonly known as:  PROTONIX   Used for:  Gastroesophageal reflux disease without  esophagitis        Dose:  40 mg   Take 1 tablet (40 mg) by mouth daily Take 30-60 minutes before a meal.   Quantity:  90 tablet   Refills:  3       tamsulosin 0.4 MG capsule   Commonly known as:  FLOMAX   Used for:  Benign prostatic hyperplasia with lower urinary tract symptoms, unspecified morphology        Dose:  0.4 mg   Take 1 capsule (0.4 mg) by mouth daily   Quantity:  90 capsule   Refills:  3       VITAMIN D (CHOLECALCIFEROL) PO        Dose:  2000 Units   Take 2,000 Units by mouth daily   Refills:  0            Where to get your medicines      Some of these will need a paper prescription and others can be bought over the counter. Ask your nurse if you have questions.     Bring a paper prescription for each of these medications    - clonazePAM 0.5 MG tablet  - HYDROcodone-acetaminophen 7.5-325 MG per tablet             Protect others around you: Learn how to safely use, store and throw away your medicines at www.disposemymeds.org.             Medication List: This is a list of all your medications and when to take them. Check marks below indicate your daily home schedule. Keep this list as a reference.      Medications           Morning Afternoon Evening Bedtime As Needed    ACETAMINOPHEN PO   Take 1,000 mg by mouth 3 times daily as needed for pain   Last time this was given:  1,000 mg on 1/9/2017 10:32 AM                                albuterol (2.5 MG/3ML) 0.083% neb solution   Take 1 vial (2.5 mg) by nebulization every 2 hours as needed for wheezing or shortness of breath / dyspnea                                amLODIPine 5 MG tablet   Commonly known as:  NORVASC   Take 1 tablet (5 mg) by mouth daily   Last time this was given:  5 mg on 1/9/2017  7:57 AM                                apixaban ANTICOAGULANT 2.5 MG tablet   Commonly known as:  ELIQUIS   Take 1 tablet (2.5 mg) by mouth 2 times daily   Last time this was given:  2.5 mg on 1/9/2017  7:55 AM                                aspirin 81 MG  chewable tablet   Take 81 mg by mouth every evening   Last time this was given:  81 mg on 1/8/2017  9:09 PM                                calcium 600 MG tablet   Take 1 tablet by mouth 2 times daily                                clonazePAM 0.5 MG tablet   Commonly known as:  klonoPIN   Take 0.5 tablets (0.25 mg) by mouth 2 times daily as needed for anxiety                                CRESTOR PO   Take 5 mg by mouth Mon, Wed, Fri evening   Last time this was given:  5 mg on 1/6/2017 10:27 PM                                ferrous sulfate 325 (65 FE) MG tablet   Commonly known as:  IRON   Take 325 mg by mouth daily   Last time this was given:  325 mg on 1/9/2017  7:55 AM                                gabapentin 100 MG capsule   Commonly known as:  NEURONTIN   Take 1 capsule (100 mg) by mouth 3 times daily   Last time this was given:  100 mg on 1/9/2017  7:57 AM                                guaiFENesin 100 MG/5ML Syrp   Commonly known as:  ROBITUSSIN   Take 5 mLs by mouth every 6 hours as needed for cough                                hydrALAZINE 25 MG tablet   Commonly known as:  APRESOLINE   Take 25 mg by mouth 2 times daily   Last time this was given:  25 mg on 1/9/2017  7:57 AM                                HYDROcodone-acetaminophen 7.5-325 MG per tablet   Commonly known as:  NORCO   Take 0.5 tablets by mouth 3 times daily as needed for moderate to severe pain Take 0.5 tablets by mouth 3 times daily as needed for moderate to severe pain                                IMODIUM OR   Take 1-2 tablets by mouth daily as needed (for loose stools)                                ipratropium - albuterol 0.5 mg/2.5 mg/3 mL 0.5-2.5 (3) MG/3ML neb solution   Commonly known as:  DUONEB   Take 1 vial (3 mLs) by nebulization 4 times daily   Last time this was given:  3 mLs on 1/9/2017  8:58 AM                                isosorbide mononitrate 30 MG 24 hr tablet   Commonly known as:  IMDUR   Take 1 tablet (30 mg) by  mouth daily   Last time this was given:  30 mg on 1/9/2017  7:57 AM                                metoprolol 25 MG tablet   Commonly known as:  LOPRESSOR   Take 0.5 tablets (12.5 mg) by mouth 2 times daily   Last time this was given:  12.5 mg on 1/9/2017  7:55 AM                                nitroglycerin 0.4 MG sublingual tablet   Commonly known as:  NITROSTAT   Place 1 tablet under the tongue every 5 minutes as needed for chest pain.                                order for DME   Equipment being ordered: Nebulizer                                pantoprazole 40 MG EC tablet   Commonly known as:  PROTONIX   Take 1 tablet (40 mg) by mouth daily Take 30-60 minutes before a meal.   Last time this was given:  40 mg on 1/9/2017  7:57 AM                                tamsulosin 0.4 MG capsule   Commonly known as:  FLOMAX   Take 1 capsule (0.4 mg) by mouth daily   Last time this was given:  0.4 mg on 1/9/2017  7:57 AM                                VITAMIN D (CHOLECALCIFEROL) PO   Take 2,000 Units by mouth daily

## 2017-01-06 NOTE — ED PROVIDER NOTES
History     Chief Complaint:  Generalized Weakness      HPI   History complemented by wife at bedside.  Jossue Mendes is a 81 year old male with a history of CVA, CAD on anticoagulants, who presents with his wife, who reports that he's complained of generalized weakness. They consulted on 12/23 at the ED for similar symptoms (see corresponding note). His wife also reports noticing him more pale than usual and that he has complained of recent left leg pain. The patient has a history of right AKA and restless leg syndrome.  The patient denies head, abdominal or chest pain, SOB, fever or bloody stools. He is currently on iron supplementation.    Allergies:  RBC antigens  Remeron soltab  Methotrexate  Atorvastatin  Bystolic  Cefuroxime  Clonidine  Relafen  Temazepam  Tramadol  Clindamycin  Indocin  Levaquin  Penicillin  Prilosec    Medications:     Clonazepam  Metoprolol  Rosuvastatin  Norco  Gabapentin  Hydralazine  Calcium  Apixaban  Tamsulosin  Imdur  Pantoprazole  Amlodipine  Aspirin 81 mg  Robitussin  Acetaminophen  Iron  Imodium  Nitroglycerin      Past Medical History:     Abdominal aneurysm  Hypertension  Rheumatoid arthritis  GERD  Chronic infection  CHF  Bruit  CAD  Glaucoma  CVA  Atrial fibrillation  Renal disease  COPD  Diabetes mellitus type 2  Pneumonia     Diabetic retinopathy     Cardiomyopathy  GI bleed  CKD    Past Surgical History:     GI surgery  Left great toe removed  Repair aortic aneurysm  Right below the knee amputation  Right endarterectomy  Appendectomy  Bypass coronary artery graft  EGD  Cataract x2    Family History:     CAD  Cancer  Diabetes     Social History:  Smoking status: Former smoker, quit 1986  Alcohol use: No  Marital Status:   [2]      Review of Systems   Constitutional: Negative for fever.        Positive for generalized weakness.   Respiratory: Negative for shortness of breath.    Cardiovascular: Negative for chest pain.   Gastrointestinal: Negative for abdominal  "pain and blood in stool.   Neurological: Negative for headaches.   All other systems reviewed and are negative.        Physical Exam   First Vitals:  BP: 102/42 mmHg  Heart Rate: 82  Temp: 98.5  F (36.9  C)  Resp: 22  Height: 175.3 cm (5' 9\")  SpO2: 93 %      Physical Exam   Constitutional: He is oriented to person, place, and time.   frfail appearing pale   HENT:   Head: Normocephalic and atraumatic.   Right Ear: External ear normal.   Mouth/Throat: Oropharynx is clear and moist.   Eyes: Conjunctivae and EOM are normal. Pupils are equal, round, and reactive to light.   Neck: Normal range of motion. Neck supple. No JVD present.   Cardiovascular: Normal rate, regular rhythm and normal heart sounds.    Pulmonary/Chest: Effort normal and breath sounds normal.   Abdominal: Soft. Bowel sounds are normal. He exhibits no distension. There is no tenderness. There is no rebound.   Musculoskeletal: Normal range of motion.   Lymphadenopathy:     He has no cervical adenopathy.   Neurological: He is alert and oriented to person, place, and time. He displays normal reflexes. No cranial nerve deficit. He exhibits normal muscle tone. Coordination normal.   Skin: Skin is warm and dry.   Psychiatric: He has a normal mood and affect. His behavior is normal. Judgment normal.   Nursing note and vitals reviewed.        Emergency Department Course     ECG (13:10:33):  Indication: general weakness.   Rate 78 bpm. NV interval 212 ms. QRS duration 100 ms. QT/QTc 414/471 ms. R axis -20.   Interpretation: Sinus rhythm wit 1st degree AVB. Inferior MI, age undetermined. Abnormal ECG.  Agree with computer interpretation.  KG dated 12/11/16: sinus rhythm with PSC and occasional PAC. T wave abnormality, consider inferior ischemia. Abnormal ECG.   Interpreted at 1315 by Dr. Orozco.     Imaging:  Radiology findings were communicated with the patient and wife who voiced understanding of the findings.  Chest XR, PA and LAT, per radiology:   No " significant change since 12/23/2016. Extensive pulmonary infiltration on the right which has not changed appreciably since 12/23/2016. Pleural thickening and/or fluid on the right. Previous sternotomy. Mild cardiomegaly. Left lung is clear.    Laboratory:  Laboratory findings were communicated with the patient and wife who voiced understanding of the findings.  Occult blood stool: negative  CBC: HGB 7.0, o/w WNL (WBC 7.2, )  BMP: Na 145, Creatinine 2.16, GFR 30, Ca 7.9, o/w WNL  1200: Troponin I: <0.015   AB0/Rh type and screen: A Rh negative, negative antibodies  INR: 1.51    Emergency Department Course:  Nursing notes and vitals reviewed.  I performed an exam of the patient as documented above.   IV was inserted and blood was drawn for laboratory testing, results above.   The patient was sent for a CXR while in the emergency department, results above. \    At 1335 the patient was rechecked and was updated on the results of his laboratory studies.   At 1449 I discussed the patient with Dr. Quan, hospitalist.  At 1500 the patient was rechecked.    I discussed the treatment plan with the patient. They expressed understanding of this plan and consented to admission. I discussed the patient with Dr. Quan, who will admit the patient to a monitored bed for further evaluation and treatment.    I personally reviewed the laboratory and imaging results with the patient and wife and answered all related questions prior to admission.    Impression & Plan      Medical Decision Making:  patient  Presents with generalized weakness. Patient has a non-focal neurological exam. Metabolic evaluation is normal. There's a significant anemia that has progressed with a HGB of only 7. I suspect at least at some point to be a symptomatic anemia and recommend blood transfusion due to his age and severe weakness. I recommend admission, care was discussed with the hospitalist who ordered 2 unit-blood transfusion and evaluate for  response.  Plan to admit. Diagnosis:  1) Generalized weakness  2) Acute on chronic anemia    Diagnosis:    ICD-10-CM    1. Weakness R53.1 ABO/Rh type and screen     Blood component     Blood component     INR     INR     Blood component     Blood component     Iron and iron binding capacity     Iron and iron binding capacity     Ferritin     Ferritin     Reticulocyte count     Reticulocyte count     Transferrin     Transferrin     CANCELED: INR       Disposition:   Adult Med/Surg     Discharge Medications:  New Prescriptions    No medications on file       Scribe Disclosure:  Rupa SAEZ, am serving as a scribe at 1:12 PM on 1/6/2017 to document services personally performed by Kevin Orozco MD, based on my observations and the provider's statements to me.          Kevin Orozco MD  01/11/17 2008

## 2017-01-06 NOTE — ED AVS SNAPSHOT
"    [facility] OBSERVATION UNIT: [phone]                                              INTERAGENCY TRANSFER FORM - PHYSICIAN ORDERS   2017                    Hospital Admission Date: 2017  JOHN ALVAREZ   : 1935  Sex: Male        Attending Provider: Juan Quan MD     Allergies:  Blood Transfusion Related (Informational Only), Remeron Soltab, Methotrexate, Atorvastatin, Bystolic, Cefuroxime, Clonidine, Relafen, Temazepam, Tramadol, Clindamycin, Indocin, Levaquin, Penicillin G, Prilosec    Infection:  MRSA-Contact Isolation, VRE-Contact Isolation   Service:  HOSPITALIST    Ht:  1.753 m (5' 9.02\")   Wt:  62.5 kg (137 lb 12.6 oz)   Admission Wt:  62.2 kg (137 lb 2 oz)    BMI:  20.34 kg/m 2   BSA:  1.74 m 2            Patient PCP Information     Provider PCP Type    Konstantin Rabago MD, MD General      ED Clinical Impression     Diagnosis Description Comment Added By Time Added    Weakness [R53.1] Weakness [R53.1]  Ping Siddiqui 2017  2:46 PM      Hospital Problems as of 2017              Priority Class Noted POA    Weakness Medium  2017 Yes      Non-Hospital Problems as of 2017              Priority Class Noted    Abdominal aortic aneurysm (H)   2012    Hyperlipidemia LDL goal <70   2012    CAD (coronary artery disease)   2012    Acute myocardial infarction of other inferior wall, episode of care unspecified   2012    CVA (cerebral vascular accident) (H)   2013    Cardiomyopathy (H)   Unknown    GI bleed   2014    CKD (chronic kidney disease) stage 3, GFR 30-59 ml/min   2014    Restless legs syndrome (RLS)   2014    Nonproliferative diabetic retinopathy of both eyes, without macular edema   10/7/2014    Branch retinal vein occlusion of left eye   10/7/2014    Cerebral infarction (H) Medium  2015    ACP (advance care planning)   2015    Atrial fibrillation (H) Medium  Unknown    Microalbuminuria Medium  10/24/2015    Type 2 diabetes " mellitus with diabetic chronic kidney disease (H) Medium  10/24/2015    S/P AKA (above knee amputation) (H) Medium  Unknown    Cataracts, both eyes Medium  2/1/2016    Gastroesophageal reflux disease without esophagitis Medium  5/18/2016    Pleural effusion Medium  7/15/2016    Asbestos-induced pleural plaque Medium  7/15/2016    Type 2 diabetes mellitus with stage 3 chronic kidney disease (H) Medium  7/27/2016    Type 2 diabetes mellitus with stage 3 chronic kidney disease, without long-term current use of insulin (H) Medium  10/18/2016    Pseudophakia of right eye Medium  10/21/2016    Aspiration pneumonia (H) Medium  11/26/2016    Pneumonia of right lower lobe due to infectious organism Medium  11/29/2016    Pneumonia Medium  12/11/2016    Sepsis (H) Medium  12/23/2016      Code Status History     Date Active Date Inactive Code Status Order ID Comments User Context    12/31/2016 10:43 AM 1/6/2017  4:07 PM DNR/DNI [id number]  Basilio Mccormick MD Outpatient    12/23/2016  3:26 PM 12/31/2016 10:43 AM DNR/DNI [id number]  Monique Arellano MD Inpatient    12/20/2016 10:58 AM 12/23/2016  3:26 PM DNR/DNI [id number]  Santiago Mccall DO Outpatient    12/11/2016 11:16 PM 12/20/2016 10:58 AM DNR/DNI [id number]  Woody Reyna MD Inpatient    11/29/2016  3:51 PM 12/11/2016 11:16 PM DNR/DNI [id number]  Jg Salinas MD Outpatient    11/26/2016  7:29 PM 11/29/2016  3:51 PM DNR/DNI [id number]  Rajwinder Thompson MD Inpatient    5/5/2016  1:57 PM 11/26/2016  7:29 PM DNR/DNI [id number] DNR/DNI accord with POLST 5/5/16 - and Health Care Directive 1/18/12. Stephani Dimas CMA Outpatient    5/5/2016  1:39 PM 5/5/2016  1:56 PM DNR [id number] DNR accord with POLST 5/5/16 - and Health Care Directive 1/18/12. Stephani Dimas CMA Outpatient    4/15/2016  1:03 PM 4/19/2016  7:50 PM DNR/DNI [id number]  Urimla Craven MD Inpatient    3/29/2016 11:49 AM 4/15/2016  1:03 PM DNR/DNI [id number]  Martinez Pizarro, DO  Outpatient    3/26/2016 11:17 AM 3/29/2016 11:49 AM DNR/DNI [id number]  CalSantiago DO Vane Inpatient    1/27/2016 10:05 AM 3/26/2016 11:17 AM DNR/DNI [id number]  Althea Boateng MD Outpatient    1/24/2016  4:35 PM 1/27/2016 10:05 AM DNR/DNI [id number]  Althea Boateng MD Inpatient    1/9/2016 12:15 PM 1/24/2016  4:35 PM Full Code [id number]  Humphrey Cruz MD Outpatient    1/7/2016  2:32 PM 1/9/2016 12:15 PM Full Code [id number]  Anam Cuenca PA-C Inpatient    11/23/2015 12:14 PM 1/7/2016  2:32 PM Full Code [id number]  Ulises Pascual MD Outpatient    11/20/2015  2:37 PM 11/23/2015 12:14 PM DNR/DNI [id number]  Ping Hunt PA-C Inpatient    8/22/2015 12:35 PM 11/20/2015  2:37 PM DNR/DNI [id number]  Bentley Banda MD Outpatient    8/19/2015  5:47 PM 8/22/2015 12:35 PM DNR/DNI [id number]  Maggie Mckay MD Inpatient    8/19/2015  5:07 PM 8/19/2015  5:47 PM DNI [id number]  Maggie Mckay MD Inpatient    3/25/2015 10:13 AM 8/19/2015  5:07 PM DNI [id number]  Althea Boateng MD Outpatient    3/22/2015  1:51 PM 3/25/2015 10:13 AM DNI [id number]  Neha Stiles MD Inpatient    1/1/2015 11:52 AM 3/22/2015  1:51 PM DNI [id number]  Dillon Hernandez MD Outpatient    12/31/2014  7:43 PM 1/1/2015 11:52 AM DNI [id number]  Floyd Nunez MD Inpatient    6/18/2014 12:46 PM 6/19/2014  4:28 PM DNI [id number]  Woody Reyna MD Inpatient    6/18/2014 12:38 PM 6/18/2014 12:46 PM DNR/DNI [id number]  Woody Reyna MD Inpatient    6/10/2014  3:27 PM 6/18/2014 12:38 PM DNR/DNI [id number]  Yazan Bhatt MD Outpatient    6/10/2014  1:39 PM 6/10/2014  3:27 PM DNR/DNI [id number]  Yazan Bhatt MD Inpatient    4/2/2013  2:32 PM 6/10/2014  1:39 PM DNR/DNI [id number]  Cherri Isidro MD Outpatient    4/1/2013  1:35 PM 4/2/2013  2:32 PM DNR/DNI [id number]  Zoila Rivera RN Inpatient    10/4/2012 11:06 AM 4/1/2013  1:35 PM Full Code [id number]  Anisha Barber,  PA Outpatient    9/13/2012  2:49 PM 9/16/2012  2:45 PM DNR/DNI [id number]  Florinda Lucio RN Inpatient    6/17/2012  8:38 AM 9/13/2012  2:49 PM DNI [id number]  Louisa Abdi MD Outpatient    6/17/2012  8:28 AM 6/17/2012  8:38 AM Full Code [id number]  Louisa Abdi MD Outpatient    6/15/2012  2:29 PM 6/17/2012  8:28 AM DNI [id number]  Veronica Garcia RN Inpatient    2/28/2012  3:06 PM 2/29/2012  4:26 PM DNR/DNI [id number]  Joseph Charles MD Inpatient    2/24/2012  5:05 PM 2/28/2012  3:06 PM Full Code [id number]  Joseph Charles MD Inpatient    1/15/2012  7:46 PM 1/19/2012  2:58 PM DNR/DNI [id number]  Ana Orozco RN Inpatient         Medication Review      CONTINUE these medications which have NOT CHANGED        Dose / Directions Comments    ACETAMINOPHEN PO        Dose:  1000 mg   Take 1,000 mg by mouth 3 times daily as needed for pain   Refills:  0        albuterol (2.5 MG/3ML) 0.083% neb solution   Used for:  Pneumonia of right lower lobe due to infectious organism        Dose:  3 mL   Take 1 vial (2.5 mg) by nebulization every 2 hours as needed for wheezing or shortness of breath / dyspnea   Quantity:  360 mL   Refills:  0        amLODIPine 5 MG tablet   Commonly known as:  NORVASC   Used for:  Hypertension        Dose:  5 mg   Take 1 tablet (5 mg) by mouth daily   Quantity:  90 tablet   Refills:  3        apixaban ANTICOAGULANT 2.5 MG tablet   Commonly known as:  ELIQUIS   Used for:  Paroxysmal atrial fibrillation (H)        Dose:  2.5 mg   Take 1 tablet (2.5 mg) by mouth 2 times daily   Quantity:  60 tablet   Refills:  11        aspirin 81 MG chewable tablet        Dose:  81 mg   Take 81 mg by mouth every evening   Refills:  0        calcium 600 MG tablet        Dose:  1 tablet   Take 1 tablet by mouth 2 times daily   Refills:  0        clonazePAM 0.5 MG tablet   Commonly known as:  klonoPIN   Used for:  Anxiety        Dose:  0.25 mg   Take 0.5 tablets (0.25 mg) by  mouth 2 times daily as needed for anxiety   Quantity:  30 tablet   Refills:  0        CRESTOR PO        Dose:  5 mg   Take 5 mg by mouth Mon, Wed, Fri evening   Refills:  0        ferrous sulfate 325 (65 FE) MG tablet   Commonly known as:  IRON        Dose:  325 mg   Take 325 mg by mouth daily   Refills:  0        gabapentin 100 MG capsule   Commonly known as:  NEURONTIN   Used for:  Diabetic polyneuropathy associated with type 2 diabetes mellitus (H)        Dose:  100 mg   Take 1 capsule (100 mg) by mouth 3 times daily   Quantity:  270 capsule   Refills:  3        guaiFENesin 100 MG/5ML Syrp   Commonly known as:  ROBITUSSIN        Dose:  5 mL   Take 5 mLs by mouth every 6 hours as needed for cough   Refills:  0        hydrALAZINE 25 MG tablet   Commonly known as:  APRESOLINE   Used for:  Cardiomyopathy (H), Benign essential hypertension        Dose:  25 mg   Take 25 mg by mouth 2 times daily   Quantity:  540 tablet   Refills:  3        HYDROcodone-acetaminophen 7.5-325 MG per tablet   Commonly known as:  NORCO   Used for:  Pain        Dose:  0.5 tablet   Take 0.5 tablets by mouth 3 times daily as needed for moderate to severe pain Take 0.5 tablets by mouth 3 times daily as needed for moderate to severe pain   Quantity:  45 tablet   Refills:  0        IMODIUM OR        Dose:  1-2 tablet   Take 1-2 tablets by mouth daily as needed (for loose stools)   Refills:  0        ipratropium - albuterol 0.5 mg/2.5 mg/3 mL 0.5-2.5 (3) MG/3ML neb solution   Commonly known as:  DUONEB   Used for:  Pneumonia of right lower lobe due to infectious organism        Dose:  3 mL   Take 1 vial (3 mLs) by nebulization 4 times daily   Quantity:  360 mL   Refills:  0        isosorbide mononitrate 30 MG 24 hr tablet   Commonly known as:  IMDUR   Used for:  Cardiomyopathy (H)        Dose:  30 mg   Take 1 tablet (30 mg) by mouth daily   Quantity:  90 tablet   Refills:  3        metoprolol 25 MG tablet   Commonly known as:  LOPRESSOR   Used  for:  Cardiomyopathy (H)        Dose:  12.5 mg   Take 0.5 tablets (12.5 mg) by mouth 2 times daily   Quantity:  60 tablet   Refills:  1        nitroglycerin 0.4 MG sublingual tablet   Commonly known as:  NITROSTAT   Used for:  CAD (coronary artery disease)        Dose:  0.4 mg   Place 1 tablet under the tongue every 5 minutes as needed for chest pain.   Quantity:  90 tablet   Refills:  0        order for DME   Used for:  Pneumonia of right lower lobe due to infectious organism        Equipment being ordered: Nebulizer   Quantity:  1 Units   Refills:  0        pantoprazole 40 MG EC tablet   Commonly known as:  PROTONIX   Used for:  Gastroesophageal reflux disease without esophagitis        Dose:  40 mg   Take 1 tablet (40 mg) by mouth daily Take 30-60 minutes before a meal.   Quantity:  90 tablet   Refills:  3    Alternative for lansoprazole since insurance not covering lansoprazole.       tamsulosin 0.4 MG capsule   Commonly known as:  FLOMAX   Used for:  Benign prostatic hyperplasia with lower urinary tract symptoms, unspecified morphology        Dose:  0.4 mg   Take 1 capsule (0.4 mg) by mouth daily   Quantity:  90 capsule   Refills:  3        VITAMIN D (CHOLECALCIFEROL) PO        Dose:  2000 Units   Take 2,000 Units by mouth daily   Refills:  0                  Further instructions from your care team       CM  You are discharging to:    [facility]  [address] [place] [address]  [phone]      Summary of Visit     Reason for your hospital stay       You were admitted due to worsening weakness at home.  You were found to have a low hemoglobin level and received two units of blood with improvement.  You were also found to be very weak and recommended to discharge to a rehab facility before going home.             After Care     Activity - Up with nursing assistance           Advance Diet as Tolerated       Follow this diet upon discharge: Orders Placed This Encounter  Combination Diet Regular Diet Adult;  Renal Diet       Bladder scan       X 2 for post void residual, straight cath per protocol       Fall precautions           General info for SNF       Length of Stay Estimate: Short Term Care: Estimated # of Days <30  Condition at Discharge: Stable  Level of care:skilled   Rehabilitation Potential: Good  Admission H&P remains valid and up-to-date: Yes  Recent Chemotherapy: N/A  Use Nursing Home Standing Orders: Yes       Mantoux instructions       Give two-step Mantoux (PPD) Per Facility Policy Yes             Procedures     Oxygen - Nasal cannula       1 Lpm by nasal cannula to keep O2 sats 92% or greater.       Oxygen - Nasal cannula       1 Lpm by nasal cannula to keep O2 sats 92% or greater. Has home O2.             Referrals     Occupational Therapy Adult Consult       Evaluate and treat as clinically indicated.    Reason:  Generalized weakness       Physical Therapy Adult Consult       Evaluate and treat as clinically indicated.    Reason:  Generalized weakness             Your next 10 appointments already scheduled     Jan 17, 2017 10:45 AM   New Visit with Tani Wang MD   [facility] ([facility]    [address]  [place] [address]   [phone]            Jan 18, 2017  8:20 AM   Office Visit with Konstantin Rabago MD   [facility] ([facility])    [address] [place] [id number] [phone]           Bring a current list of meds and any records pertaining to this visit.  For Physicals, please bring immunization records and any forms needing to be filled out.  Please arrive 10 minutes early to complete paperwork.            Jan 26, 2017  8:30 AM   US AORTA/IVC/ILIAC DUPLEX COMPLETE with SHVUS2   [facility] ([facility]    [address]  [place] [address]   [phone]           Please bring a list of your medicines (including  vitamins, minerals and over-the-counter drugs). Also, tell your doctor about any allergies you may have. Wear comfortable clothes and leave your valuables at home.  Adults: No eating or drinking for 8 hours before the exam. You may take medicine with a small sip of water.  Children: - Children 6+ years: No food or drink for 6 hours before exam. - Children 1-5 years: No food or drink for 4 hours before exam. - Infants, breast-fed: may have breast milk up to 2 hours before exam. - Infants, formula: may have bottle until 4 hours before exam.  Please call the Imaging Department at your exam site with any questions.            Jan 26, 2017  9:30 AM   US CAROTID BILATERAL with SHVUS2   [facility] ([facility])    [address]  [facility] [address]   [phone]           Please bring a list of your medicines (including vitamins, minerals and over-the-counter drugs). Also, tell your doctor about any allergies you may have. Wear comfortable clothes and leave your valuables at home.  You do not need to do anything special to prepare for your exam.  Please call the Imaging Department at your exam site with any questions.            Jan 26, 2017 10:30 AM   Return Visit with Rj Quan MD   [facility] ([facility])    [address]  [facility] [id number] [phone]            Jan 26, 2017  1:00 PM   [facility]   [facility]    [address] [place] [address]   [phone]            Mar 08, 2017 10:50 AM   Return Visit with MIREYA Ulloa [facility] ([facility])    [address] [place] [address] [address]  [facility] [address]   [phone]              Follow-Up Appointment Instructions     Follow Up and  recommended labs and tests       Follow up with primary care provider in 1-2 weeks.  The following labs/tests are recommended: repeat Hemoglobin.  Follow up with specialist, Urology, in 1-2 weeks.  No follow up labs or test are needed.       Follow Up and recommended labs and tests       You are scheduled to see Dr. Wang on Tuesday, January 17th at 10:45 AM.  This appointment is at the [facility] in the [address] [place] [address].  [address] [place] [address]  [place] [address]  [phone]             Statement of Approval     Ordered          01/09/17 0857  I have reviewed and agree with all the recommendations and orders detailed in this document.   EFFECTIVE NOW     Approved and electronically signed by:  Ping Hunt PA-C

## 2017-01-06 NOTE — ED AVS SNAPSHOT
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  [81-87] 84  Resp:  [16] 16  BP: (105-151)/(59-72) 151/65 mmHg  SpO2:  [95 %-97 %] 96 %  I/O last 3 completed shifts:  In: 180 [P.O.:180]  Out: 1175 [Urine:1175]    CONSTITUTIONAL: Pt laying in bed, dressed in hospital garb. Appears comfortable. Cooperative with interview. Accompanied by wife at bedside.  HEENT: Normocephalic, atraumatic. Negative for conjunctival redness or scleral icterus.    CARDIOVASCULAR: RRR, no murmurs, rubs, or extra heart sounds appreciated. Pulses +2/4 and regular in upper and lower extremities, bilaterally.   RESPIRATORY: No increased work of breathing.  CTA, bilat; no wheezes, rales, or rhonchi appreciated.  GASTROINTESTINAL:  Abdomen soft, non-distended. BS auscultated in all four quadrants. Negative for tenderness to palpation.  No masses or organomegaly noted.  MUSCULOSKELETAL:  Right AKA noted   HEMATOLOGIC/LYMPHATIC/IMMUNOLOGIC: Negative for lower extremity edema, bilaterally.  SKIN: Warm, dry, intact. No jaundice noted. Negative for suspicious lesions, rashes, bruising, open sores or abrasions.     Discharge  Disposition  Discharged to TCU  Condition at discharge: Stable    Consultations This Hospital Stay  SOCIAL WORK IP CONSULT  PHYSICAL THERAPY ADULT IP CONSULT  PHYSICAL THERAPY ADULT IP CONSULT  OCCUPATIONAL THERAPY ADULT IP CONSULT    Time Spent on This Encounter  I, Ping Hunt, personally saw the patient today and spent less than or equal to 30 minutes discharging this patient.    Discharge Orders    General info for SNF   Length of Stay Estimate: Short Term Care: Estimated # of Days <30  Condition at Discharge: Stable  Level of care:skilled   Rehabilitation Potential: Good  Admission H&P remains valid and up-to-date: Yes  Recent Chemotherapy: N/A  Use Nursing Home Standing Orders: Yes     Mantoux instructions   Give two-step Mantoux (PPD) Per Facility Policy Yes     Reason for your hospital stay   You were admitted due to worsening weakness at home.  You were found to have a low hemoglobin level and received two units of blood with improvement.  You were also found to be very weak and recommended to discharge to a rehab facility before going home.     Activity - Up with nursing assistance     Follow Up and recommended labs and tests   Follow up with primary care provider in 1-2 weeks.  The following labs/tests are recommended: repeat Hemoglobin.  Follow up with specialist, Urology, in 1-2 weeks.  No follow up labs or test are needed.     Physical Therapy Adult Consult   Evaluate and treat as clinically indicated.    Reason:  Generalized weakness     Occupational Therapy Adult Consult   Evaluate and treat as clinically indicated.    Reason:  Generalized weakness     Oxygen - Nasal cannula   1 Lpm by nasal cannula to keep O2 sats 92% or greater.     Fall precautions     Advance Diet as Tolerated   Follow this diet upon discharge: Orders Placed This Encounter  Combination Diet Regular Diet Adult; Renal Diet       Discharge Medications  Current Discharge Medication List      CONTINUE these medications  which have CHANGED    Details   HYDROcodone-acetaminophen (NORCO) 7.5-325 MG per tablet Take 0.5 tablets by mouth 3 times daily as needed for moderate to severe pain Take 0.5 tablets by mouth 3 times daily as needed for moderate to severe pain  Qty: 45 tablet, Refills: 0    Associated Diagnoses: Pain      clonazePAM (KLONOPIN) 0.5 MG tablet Take 0.5 tablets (0.25 mg) by mouth 2 times daily as needed for anxiety  Qty: 30 tablet, Refills: 0    Associated Diagnoses: Anxiety         CONTINUE these medications which have NOT CHANGED    Details   Rosuvastatin Calcium (CRESTOR PO) Take 5 mg by mouth Mon, Wed, Fri evening      albuterol (2.5 MG/3ML) 0.083% neb solution Take 1 vial (2.5 mg) by nebulization every 2 hours as needed for wheezing or shortness of breath / dyspnea  Qty: 360 mL, Refills: 0    Associated Diagnoses: Pneumonia of right lower lobe due to infectious organism      ipratropium - albuterol 0.5 mg/2.5 mg/3 mL (DUONEB) 0.5-2.5 (3) MG/3ML neb solution Take 1 vial (3 mLs) by nebulization 4 times daily  Qty: 360 mL, Refills: 0    Associated Diagnoses: Pneumonia of right lower lobe due to infectious organism      metoprolol (LOPRESSOR) 25 MG tablet Take 0.5 tablets (12.5 mg) by mouth 2 times daily  Qty: 60 tablet, Refills: 1    Associated Diagnoses: Cardiomyopathy (H)      gabapentin (NEURONTIN) 100 MG capsule Take 1 capsule (100 mg) by mouth 3 times daily  Qty: 270 capsule, Refills: 3    Associated Diagnoses: Diabetic polyneuropathy associated with type 2 diabetes mellitus (H)      hydrALAZINE (APRESOLINE) 25 MG tablet Take 25 mg by mouth 2 times daily   Qty: 540 tablet, Refills: 3    Associated Diagnoses: Cardiomyopathy (H); Benign essential hypertension      calcium 600 MG tablet Take 1 tablet by mouth 2 times daily      apixaban ANTICOAGULANT (ELIQUIS) 2.5 MG tablet Take 1 tablet (2.5 mg) by mouth 2 times daily  Qty: 60 tablet, Refills: 11    Associated Diagnoses: Paroxysmal atrial fibrillation (H)       tamsulosin (FLOMAX) 0.4 MG 24 hr capsule Take 1 capsule (0.4 mg) by mouth daily  Qty: 90 capsule, Refills: 3    Associated Diagnoses: Benign prostatic hyperplasia with lower urinary tract symptoms, unspecified morphology      isosorbide mononitrate (IMDUR) 30 MG 24 hr tablet Take 1 tablet (30 mg) by mouth daily  Qty: 90 tablet, Refills: 3    Associated Diagnoses: Cardiomyopathy (H)      pantoprazole (PROTONIX) 40 MG enteric coated tablet Take 1 tablet (40 mg) by mouth daily Take 30-60 minutes before a meal.  Qty: 90 tablet, Refills: 3    Comments: Alternative for lansoprazole since insurance not covering lansoprazole.  Associated Diagnoses: Gastroesophageal reflux disease without esophagitis      amLODIPine (NORVASC) 5 MG tablet Take 1 tablet (5 mg) by mouth daily  Qty: 90 tablet, Refills: 3    Associated Diagnoses: Hypertension      aspirin 81 MG chewable tablet Take 81 mg by mouth every evening       guaiFENesin (ROBITUSSIN) 100 MG/5ML SYRP Take 5 mLs by mouth every 6 hours as needed for cough       ACETAMINOPHEN PO Take 1,000 mg by mouth 3 times daily as needed for pain      VITAMIN D, CHOLECALCIFEROL, PO Take 2,000 Units by mouth daily       ferrous sulfate 325 (65 FE) MG tablet Take 325 mg by mouth daily      Loperamide HCl (IMODIUM OR) Take 1-2 tablets by mouth daily as needed (for loose stools)       nitroGLYCERIN (NITROSTAT) 0.4 MG SL tablet Place 1 tablet under the tongue every 5 minutes as needed for chest pain.  Qty: 90 tablet, Refills: 0    Associated Diagnoses: CAD (coronary artery disease)      order for DME Equipment being ordered: Nebulizer  Qty: 1 Units, Refills: 0    Associated Diagnoses: Pneumonia of right lower lobe due to infectious organism           Allergies  Allergies   Allergen Reactions     Blood Transfusion Related (Informational Only) Other (See Comments)     Patient has a history of a clinically significant antibody against RBC antigens.  A delay in compatible RBCs may occur.      Remeron Soltab      Hostility, very aggresive     Methotrexate      Narcosis of jaw     Atorvastatin      Legs jump     Bystolic [Nebivolol Hcl]      Cefuroxime Other (See Comments)     Weakness       Clonidine      hostility     Relafen [Nabumetone]      Temazepam      Cant sleep     Tramadol Itching     And dizzy     Clindamycin Rash     Back pain     Indocin Rash     Rash and blisters     Levaquin [Levofloxacin] Rash     Penicillin G Rash     Prilosec [Omeprazole Magnesium] Rash     Data  Most Recent 3 CBC's:  Recent Labs   Lab Test  01/09/17   0542  01/07/17   0630  01/06/17   1200  12/31/16   0912   WBC   --   9.2  7.2  6.5   HGB  9.3*  9.9*  7.0*  8.0*   MCV   --   90  89  90   PLT   --   238  242  235      Most Recent 3 BMP's:  Recent Labs   Lab Test  01/06/17   1200  12/31/16   0912  12/30/16   0824   NA  145*  145*  145*   POTASSIUM  4.2  3.9  4.3   CHLORIDE  109  110*  113*   CO2  27  28  24   BUN  26  17  17   CR  2.16*  1.74*  1.72*   ANIONGAP  9  7  8   AURORA  7.9*  7.8*  7.9*   GLC  144*  132*  118*     Most Recent 2 LFT's:  Recent Labs   Lab Test  12/23/16   1211  12/11/16   1948   AST  13  27   ALT  7  25   ALKPHOS  58  51   BILITOTAL  0.4  0.3     Most Recent INR's and Anticoagulation Dosing History:  Anticoagulation Dose History     Recent Dosing and Labs Latest Ref Rng 6/8/2014 6/18/2014 4/1/2015 8/19/2015 1/24/2016 3/26/2016 1/6/2017    INR 0.86 - 1.14 1.02 1.00 1 0.87 1.15(H) 1.39(H) 1.51(H)        Most Recent 3 Troponin's:  Recent Labs   Lab Test  01/06/17   1200  04/17/16   0350  04/16/16   1504   TROPI  <0.015  The 99th percentile for upper reference range is 0.045 ug/L.  Troponin values in   the range of 0.045 - 0.120 ug/L may be associated with risks of adverse   clinical events.    0.208*  0.256*     Most Recent Cholesterol Panel:  Recent Labs   Lab Test  12/08/16   0848   CHOL  183   LDL  107*   HDL  41   TRIG  174*     Most Recent 6 Bacteria Isolates From Any Culture (See EPIC Reports for  Culture Details):  Recent Labs   Lab Test  12/29/16   1017  12/29/16   1016  12/29/16   1015  12/25/16   0501  12/25/16   0445  12/23/16   1223   CULT  Lisa glabrata isolated*  Light growth Enterococcus species Susceptibility testing done on previous   specimen  Plus Light growth Normal respiratory katerin  *  Candida glabrata isolated*  Culture received and in progress.  Positive AFB results are called as soon as   detected.  Final report to follow in 7 to 8 weeks.  Assayed at [facility] [address]    Light growth Enterococcus species  Plus Light growth Normal respiratory katerin  *  Candida glabrata isolated*  Culture received and in progress.  Positive AFB results are called as soon as   detected.  Final report to follow in 7 to 8 weeks.  Assayed at [facility] [address]    Light growth Normal respiratory katerin  Canceled, Test credited Duplicate request  Moderate growth Normal katerin  Moderate growth Candida glabrata  *  No growth     Most Recent TSH, T4 and A1c Labs:  Recent Labs   Lab Test  01/07/17   0630   09/23/16   1307   09/27/10   1050   TSH   --    --   1.00   < >  <0.03*   T4   --    --    --    --   0.91   A1C  6.9*   < >   --    < >   --     < > = values in this interval not displayed.     Results for orders placed or performed during the hospital encounter of 01/06/17   Chest XR,  PA & LAT    Narrative    XR CHEST 2 VW  1/6/2017 1:23 PM     HISTORY:  Weakness.      Impression    IMPRESSION: No significant change since 12/23/2016. Extensive  pulmonary infiltration on the right which has not changed appreciably  since 12/23/2016. Pleural thickening and/or fluid on the right.  Previous sternotomy. Mild cardiomegaly. Left lung is clear.    ALONSO GUILLEN MD     *Note: Due to a large number of results and/or encounters for the requested time period, some results have not been displayed. A complete set of results can be found in Results Review.                     Consult Notes     No notes of this type exist for this encounter.         Progress Notes - Physician (Notes from 01/06/17 through 01/09/17)      Progress Notes by Jaclyn Ivey at 1/9/2017  8:18 AM     Author:  Jaclyn Ivey Service:  Social Work Author Type:      Filed:  1/9/2017  9:40 AM Note Time:  1/9/2017  8:18 AM Status:  Addendum    :  Jaclyn Ivey ()      Related Notes: Original Note by Jaclyn Ivey () filed at 1/9/2017  8:52 AM         CM    I:  SW received a call from Admissions rep, Sue, confirming patient is going to [facility] in [place].  SW reviewed chart and confirmed this.  Ride is set for: 11:30 am. Will fax orders and PAS to: [phone] when completed.    P:  Continue to assist with discharge planning as needed.    FELIZ Johnson    ADDENDUM:  Patient is on 02 at the present time.  SW placed call to TCU admissions to inform them as they were not aware.  Patient does have his own portable tank and w/c in room for transport. [facility] aware  PAS-RR    D: Per DHS regulation, TOLU completed and submitted PAS-RR to [place] [employer] Direct Connect via the Senior LinkAge Line.  PAS-RR confirmation # is : [id number]    I: SW spoke with patient and they are aware a PAS-RR has been submitted.  TOLU reviewed with patient that they may be contacted for a follow up appointment within 10 days of hospital discharge if their SNF stay is < 30 days.  Contact information for [facility] LinkAge Line was also provided.    A: patient verbalized understanding.    P: Further questions may be directed to [facility] LinkAge Line at #[phone], option #4 for PAS-RR staff.      No further SW interventions anticipated at this time.  FELIZ Johnson  [phone]       Progress Notes by Clair Rivera RN at 1/9/2017  8:02 AM     Author:  Clair Rivera, RN Service:  (none) Author Type:  Registered Nurse    Filed:  1/9/2017  8:02 AM Note Time:   1/9/2017  8:02 AM Status:  Signed    :  Clair Rivera RN (Registered Nurse)           EKG department to place Holter monitor on the patient prior to his d/c today to the [facility].        Progress Notes by Clair Rivera RN at 1/9/2017  7:42 AM     Author:  Clair Rivera RN Service:  (none) Author Type:  Registered Nurse    Filed:  1/9/2017  7:45 AM Note Time:  1/9/2017  7:42 AM Status:  Signed    :  Clair Rivera RN (Registered Nurse)           Paged and spoke with JOSE Davis   regarding placing garcía vs. Straight cath prn orders.  PA also informed narcotic scripts need to be signed prior to d/c.        Progress Notes by Luz Maria Cheek RN at 1/9/2017  4:00 AM     Author:  Luz Maria Cehek RN Service:  (none) Author Type:  Registered Nurse    Filed:  1/9/2017  4:10 AM Note Time:  1/9/2017  4:00 AM Status:  Signed    :  Luz Maria Cheek RN (Registered Nurse)           List all goals to be met before discharge home        1) No noted blood in stool or emesis: Met    2) Weakness improved (appropriate for d/c home with wife assist vs safe discharge plan):  Met. Patient accepted at the [facility].  Ride is set-up for 1000 this morning      3) Hgb >8: Met- last Hgb 9.9                             Progress Notes by Luz Maria Cheek RN at 1/9/2017 12:00 AM     Author:  Luz Maria Cheek RN Service:  (none) Author Type:  Registered Nurse    Filed:  1/9/2017 12:49 AM Note Time:  1/9/2017 12:00 AM Status:  Signed    :  Luz Maria Cheek RN (Registered Nurse)           List all goals to be met before discharge home        1) No noted blood in stool or emesis: Met    2) Weakness improved (appropriate for d/c home with wife assist vs safe discharge plan):  Met. Patient accepted at the [facility].  Ride is set-up for 1000 tomorrow.      3) Hgb >8: Met- last Hgb 9.9                             Progress Notes by Luz Maria Cheek RN at 1/8/2017  8:00 PM     Author:  Luz Maria Cheek RN Service:   (none) Author Type:  Registered Nurse    Filed:  1/8/2017 10:28 PM Note Time:  1/8/2017  8:00 PM Status:  Signed    :  Luz Maria Cheek RN (Registered Nurse)           List all goals to be met before discharge home        1) No noted blood in stool or emesis: Met    2) Weakness improved (appropriate for d/c home with wife assist vs safe discharge plan):  Met. Patient accepted at the [facility].  Ride is set-up for 1000 tomorrow.      3) Hgb >8: Met- last Hgb 9.9                             Progress Notes by Clair Rivera RN at 1/8/2017  6:00 PM     Author:  Clair Rivera RN Service:  (none) Author Type:  Registered Nurse    Filed:  1/8/2017  6:57 PM Note Time:  1/8/2017  6:00 PM Status:  Signed    :  Clair Rivera RN (Registered Nurse)           List all goals to be met before discharge home        1) No noted blood in stool or emesis: Met    2) Weakness improved (appropriate for d/c home with wife assist vs safe discharge plan):  Met. Patient accepted at the [facility].  Ride is set-up for 1000 tomorrow.      3) Hgb >8: Met- last Hgb 9.9                             Progress Notes by Clair Rivera RN at 1/8/2017  4:00 PM     Author:  Clair Rivera RN Service:  (none) Author Type:  Registered Nurse    Filed:  1/8/2017  4:59 PM Note Time:  1/8/2017  4:00 PM Status:  Signed    :  Clair Rivera RN (Registered Nurse)           List all goals to be met before discharge home        1) No noted blood in stool or emesis: Met    2) Weakness improved (appropriate for d/c home with wife assist vs safe discharge plan): partially  met. SW working on putting out referrals.  Waiting to call back from facitlities.     3) Hgb >8: Met- last Hgb 9.9                     Progress Notes by Clair Rivera RN at 1/8/2017  4:56 PM     Author:  Clair Rivera RN Service:  (none) Author Type:  Registered Nurse    Filed:  1/8/2017  4:57 PM Note Time:  1/8/2017  4:56 PM Status:  Signed    :  Miguel  Clair Ying, RN (Registered Nurse)           Call received from JOSE Diaz.  Charles catheter will be removed and staffing will complete a voiding trial as ordered by Dr. Reyna.   Will remove when patient if finished eating supper.        Progress Notes by Dudley Whatley LSW at 1/8/2017  4:31 PM     Author:  Dudley Whatley LSW Service:  Social Work Author Type:      Filed:  1/8/2017  4:55 PM Note Time:  1/8/2017  4:31 PM Status:  Addendum    :  Dudley Whatley LSW ()      Related Notes: Original Note by Dudley Whatley LSW () filed at 1/8/2017  4:49 PM         SW  D: This writer received a call from Sue at [facility] and they are able to accept pt tomorrow and he would be able to leave [facility] as early as 1000 if medically stable. Per pt he would like to have a w/c ride arranged through [facility] and is aware this is private pay, pt has his own w/c in the hospital room. Pt and family are agreeable to the d/c plan to [facility] [place].  P: Pt plans to d/c to [facility] in [place] on 1/9.    Addendum  This writer called to set up a ride for tomorrow and the earliest [facility] has is an 1130 so this was arranged for pt.       Progress Notes by Darwin Downs PA-C at 1/8/2017  1:35 PM     Author:  Darwin Downs PA-C Service:  Hospitalist Author Type:  Physician Assistant - C    Filed:  1/8/2017  1:41 PM Note Time:  1/8/2017  1:35 PM Status:  Attested    :  Darwin Downs PA-C (Physician Assistant - C) Cosigner:  Woody Reyna MD at 1/8/2017  4:46 PM    Attestation signed by Woody Reyna MD at 1/8/2017  4:46 PM (Updated)        Physician Attestation  Woody SAEZ, saw and evaluated Jossue Mendes as part of a shared visit.  I have reviewed and discussed with the advanced practice provider their history, physical and plan.    I personally reviewed the vital signs, medications, labs and imaging.    My key history or physical  exam findings:   Yesterday was a bit oversedated from oxycodone per pt's wife which he usually has some issues with in the past. Today doing better. Breathing OK.  On exam: alert, pleasant; heart reg rate, no m/r/g; lungs diminished with few crackles more on L, no wheezes; abd soft, nt, nd, +BS.    Key management decisions made by me:   This is a 81-year-old male patient with history including coronary artery disease, congestive heart failure, diabetes mellitus type 2, hypertension, atrial fibrillation, peripheral arterial disease, prior right above-knee amputation, stroke, rheumatoid arthritis, dyslipidemia, gastroesophageal reflux disease, chronic kidney disease, chronic anemia, depression/anxiety, benign prostatic hypertrophy, restless leg syndrome and history of multiple hospitalizations over the past 2 years including 6 prior hospitalizations last year with pneumonia, most recently twice in 12/2016.  He presented 1/6 weakness and found to be anemic with hgb 7.0.    Anemia, suspect multifactorial - suspect ACD +/- iron deficiency, CKD, ?slow GI losses (on ASA and Eliquis) though negative stool hemoccult.   Had colonoscopy 2010 with suggestion of ischemic colitis at that time, otherwise OK. Negative EGD noted 2015.   - Continue Fe.  - F/u with primary MD - can consider further GI eval vs try stopping ASA.  - ?consider EPO, can discuss with Nephrology after d/c.  - Continue to monitor CBC periodically.  - Consider prbc transfusion if hgb <7.0 or if significant bleeding with hemodynamic instability or if symptomatic.    Urinary retention.  Pt had Charles which was recently d/c'ed by Urology last week. Per wife, had been voiding well thereafter. Replaced 1/7 due to retention, however had received oxycodone which may have contributed.   - Try d/c Charles.  - Bladder scans and PRN SIC - can continue at TCU if needed.    Other issues:   - Plan as per Darwin Downs PA-C's note.  - Also was planned to have Holter placed 1/9  "ordered by Dr. Walker - can have this done before d/c tomorrow.    I d/w pt's wife.    Woody Reyna  Date of Service (when I saw the patient): 01/08/2017                        [facility]    Hospitalist Progress Note    Date of Service (when I saw the patient): 01/08/2017    Assessment and Plan  Jossue Mendes is a 81 year old male who presents with weakness which appears to have worsened over the past 3 days in the setting of multiple recent hospitalizations including recent hospitalization for approximately one week and discharged 12/31/16 as well as complaints of \"total body ache.\"    Weakness and deconditioning: anticipate multifactorial. Patient is nonambulatory at baseline, though transfers from bed to wheelchair with his history of right AKA. Patient has a prosthesis, though does not use this. With multiple recent hospitalizations including one week hospitalization with discharge 12/31/16, anticipate some degree of deconditioning related to this. Home Occupational therapy assessed patient 1/3/16, recommendation was for 2 additional visits this month.   Received blood transfusion x2 with improvement in hgb, as discussed below.    - PT evaluated, recommending TCU  - Social work consulted and following; working on TCU placement  - Continue lortab elixir 7.5mL TID PRN, which is equivalent to pt's home tablet dose    Anemia: Suspect multifactorial in the setting of chronic kidney disease, chronic anticoagulation, anemia of chronic disease with recent multiple hospitalizations for right sided pneumonia. Patient is on both Chronic anticoagulation as well as aspirin given history of coronary artery disease and atrial fibrillation. Per discussion of wife, primary cardiologist, Dr. Walker, is aware of this. Negative occult blood in ED. Patient has had a history of erosive gastritis on EGD in 2011, though resolved on repeat 2015.  Hgb 7.0--9.9 after 2u PRBCs.  - Continue Protonix 40 mg daily  - " Continuing apixaban as well as 81 mg aspirin; low threshold to discontinue aspirin in the setting of chronic anticoagulation.   - Continue prior to admission iron supplementation; note iron saturation of 11% on admission  - Discussed with patient and wife regarding goals of care. Patient has a POLST on file describing a limited care plan focused on comfort and remaining at home.    - Recommend follow-up visit with primary provider, Dr. Rabago, in approximately 1 week with recheck of CBC and discussion at that time regarding outpatient evaluation through gastroenterology. If outpatient evaluation is pursued, consider discontinuation of Eliquis approximately 5 days prior to endoscopy. Patient and wife are in agreement with this plan, they themselves are uncertain if they would want to pursue endoscopy.    Type 2 diabetes with diabetic retinopathy, nephropathy: most recent hemoglobin A1c in 2016 of 7.0, on repeat today 6.9.  Typically diet controlled.  -medium dose sliding scale insulin available    Atrial fibrillation: stable, rate controlled.  Continue eliquis 2.5 mg b.i.d. And metoprolol 12.5 g b.i.d.     Coronary artery disease status post historical coronary artery bypass graftin LIMA to LAD, SVG to PDA and OM 2 with associated historical ischemic cardiomyopathy. Does not appear to be in heart failure currently.  - Continuing chronic anticoagulation as above  - Continues on PTA Hydralazine 25 mg b.i.d., Imdur 30 mg daily, Metoprolol 12.5 mg b.i.d., Rosuvastatin 5 mg daily, Amlodipine 5 mg daily, Aspirin 81 mg daily    Urinary retention with BPH: patient with significant urinary retention two admissions ago with Charles catheter placed 16. Followed up with urology as an outpatient with removal of Charles catheter 16.  UA without evidence of infection.  Patient overnight day #2 had persistent urinary retention requiring straight catherization x1 for 550cc urine at 0700, with ongoing retention  throughout day requiring garcía catheter placement.  - García management  - Intake and output  - Continue prior to admission Flomax  - Follow up with Urology within one week    DVT Prophylaxis: Warfarin  Code Status: DNR/DNI    Disposition: Expected discharge in 1-2 days once safe discharge established.    Darwin Downs PA-C    Interval History  Patient sitting up in bed, denies discomfort presently as is on prior pain medications.  Having continued weakness, feels is slightly improving.  Continues to plan on TCU at discharge.  No cp, sob.  No acute concerns per pt or wife.    -Data reviewed today: I reviewed all new labs and imaging results over the last 24 hours. I personally reviewed no images or EKG's today.    Physical Exam  Temp: 98.1  F (36.7  C) Temp src: Oral BP: 129/63 mmHg   Heart Rate: 78 Resp: 16 SpO2: 96 % O2 Device: Nasal cannula Oxygen Delivery: 2 LPM  Filed Vitals:    01/06/17 1527 01/08/17 0700   Weight: 62.2 kg (137 lb 2 oz) 62.5 kg (137 lb 12.6 oz)     Vital Signs with Ranges  Temp:  [96.9  F (36.1  C)-98.3  F (36.8  C)] 98.1  F (36.7  C)  Heart Rate:  [71-80] 78  Resp:  [16] 16  BP: (113-165)/(63-72) 129/63 mmHg  SpO2:  [94 %-96 %] 96 %  I/O last 3 completed shifts:  In: 360 [P.O.:360]  Out: 1050 [Urine:1050]    GEN: well-developed, thin male, appears comfortable  PULM: lungs CTA bilaterally, no increased work of breathing, no wheeze, crackles, rhonchi  CV: irregularly irregular, S1/S2  GI: soft, nontender, nondistended, +BS in all 4 quadrants  SKIN: pale, warm & dry without rash or wound, no pedal edema    Medications       sodium chloride (PF)  3 mL Intracatheter Q8H     amLODIPine  5 mg Oral Daily     apixaban ANTICOAGULANT  2.5 mg Oral BID     aspirin  81 mg Oral QPM     ferrous sulfate  325 mg Oral Daily     gabapentin  100 mg Oral TID     hydrALAZINE  25 mg Oral BID     ipratropium - albuterol 0.5 mg/2.5 mg/3 mL  3 mL Nebulization 4x Daily     isosorbide mononitrate  30 mg Oral Daily      metoprolol  12.5 mg Oral BID     pantoprazole  40 mg Oral Daily     rosuvastatin (CRESTOR) tablet 5 mg  5 mg Oral Once per day on Mon Wed Fri     tamsulosin  0.4 mg Oral Daily     sodium chloride (PF)  3 mL Intracatheter Q8H     insulin aspart  1-7 Units Subcutaneous TID AC     insulin aspart  1-5 Units Subcutaneous At Bedtime       Data    Recent Labs  Lab 01/07/17  0630 01/06/17  1200   WBC 9.2 7.2   HGB 9.9* 7.0*   MCV 90 89    242   INR  --  1.51*   NA  --  145*   POTASSIUM  --  4.2   CHLORIDE  --  109   CO2  --  27   BUN  --  26   CR  --  2.16*   ANIONGAP  --  9   AURORA  --  7.9*   GLC  --  144*   TROPI  --  <0.015The 99th percentile for upper reference range is 0.045 ug/L.  Troponin values in the range of 0.045 - 0.120 ug/L may be associated with risks of adverse clinical events.       No results found for this or any previous visit (from the past 24 hour(s)).       Progress Notes by Dudley Whatley LSW at 1/7/2017  3:01 PM     Author:  Dudley Whatley LSW Service:  Social Work Author Type:      Filed:  1/8/2017  4:31 PM Note Time:  1/7/2017  3:01 PM Status:  Addendum    :  Dudley Whatley LSW ()      Related Notes: Original Note by Dudley Whatley LSW () filed at 1/7/2017  3:17 PM         TOLU  D: Met with pt, pt.'s spouse and pt.'s dtr to discuss d/c planning. Per pt.'s wife pt has done rehab several times at [facility] in [facility] which is near to pt.'s home and that has gone well, but pt.'s wife stated that she would prefer to have a [place] team round on him while he is in rehab so she gave the additional choices of [facility] and [facility] in [place]. Calls out to facilities regarding availability.  P:  will continue to follow to assist with d/c to TCU.    TOLU  D: [facility] contacted this writer back and they are not able to accept pt at this time per the RN fielding admission calls. This writer also talked to  "staff at [facility] and they are not able to accept pt due to a shortage of RN's so they currently have a freeze on admissions.    This writer contacted [facility] to see if they had beds available and they do not for today or tomorrow. A msg was left at [facility] in [place] inquiring about bed availability.       Progress Notes by Darwin Downs PA-C at 1/7/2017 12:39 PM     Author:  Darwin Downs PA-C Service:  Hospitalist Author Type:  Physician Assistant - C    Filed:  1/7/2017 12:58 PM Note Time:  1/7/2017 12:39 PM Status:  Attested    :  Darwin Downs PA-C (Physician Assistant - C) Cosigner:  Woody Reyna MD at 1/8/2017  3:39 PM    Attestation signed by Woody Reyna MD at 1/8/2017  3:39 PM        Physician Attestation  I, Woody Reyna, have reviewed and discussed with the advanced practice provider their history, physical and plan for Jossue Mendes. I did not participate in a shared visit by interviewing or examining the patient and this should be billed as an advanced practice provider only visit.    Woody Reyna  Date of Service (when I saw the patient): I did not personally see this patient today.                        [facility]    Hospitalist Progress Note    Date of Service (when I saw the patient): 01/07/2017    Assessment and Plan  Jossue Mendes is a 81 year old male who presents with weakness which appears to have worsened over the past 3 days in the setting of multiple recent hospitalizations including recent hospitalization for approximately one week and discharged 12/31/16 as well as complaints of \"total body ache.\"    Weakness and deconditioning: anticipate multifactorial. Patient is nonambulatory at baseline, though transfers from bed to wheelchair with his history of right AKA. Patient has a prosthesis, though does not use this. With multiple recent hospitalizations including one week hospitalization with discharge " "12/31/16, anticipate some degree of deconditioning related to this. Home Occupational therapy assessed patient 1/3/16, recommendation was for 2 additional visits this month.   Received blood transfusion x2 overnight.  This morning, nursing staff had to utilize lift to get patient out of bed 2/2 ongoing weakness.  Wife presented and feels this may be due to receiving oxycodone in lieu of PTA norco.  Wife notes he takes 1/2 tab of the 7.5/325 pill TID PRN and anytime there is a stronger agent ordered, he gets \"loopy and very weak.\"  - Will ask PT to evaluate today given significant difficulty getting out of bed  - Social work consulted and following; I discussed with wife and daughter the possibility of requiring TCU at discharge to regain strength; both are in agreement and feel he would benefit as he was not doing well at home  - Discontinue oxycodone; as we are unable to provide 1/2 tab of norco per home list (due to availability/pharmacy) will write for lortab elixir 7.5mL TID PRN, which is equivalent to pt's home dose    Anemia: Suspect multifactorial in the setting of chronic kidney disease, chronic anticoagulation, anemia of chronic disease with recent multiple hospitalizations for right sided pneumonia. Patient is on both Chronic anticoagulation as well as aspirin given history of coronary artery disease and atrial fibrillation. Per discussion of wife, primary cardiologist, Dr. Walker, is aware of this. Negative occult blood in ED. Patient has had a history of erosive gastritis on EGD in 2011, though resolved on repeat 2015.  Hgb 7.0--9.9 after 2u PRBCs.  - Continue Protonix 40 mg daily  - Continuing apixaban as well as 81 mg aspirin; low threshold to discontinue aspirin in the setting of chronic anticoagulation.   - Continue prior to admission iron supplementation; note iron saturation of 11% on admission  - Discussed with patient and wife regarding goals of care. Patient has a POLST on file describing a limited " care plan focused on comfort and remaining at home.   - Recommend follow-up visit with primary provider, Dr. Rabago, in approximately 1 week with recheck of CBC and discussion at that time regarding outpatient evaluation through gastroenterology. If outpatient evaluation is pursued, consider discontinuation of Eliquis approximately 5 days prior to endoscopy. Patient and wife are in agreement with this plan, they themselves are uncertain if they would want to pursue endoscopy.    Type 2 diabetes with diabetic retinopathy, nephropathy: most recent hemoglobin A1c in 2016 of 7.0, on repeat today 6.9.  Typically diet controlled.  -medium dose sliding scale insulin available    Atrial fibrillation: stable, rate controlled.  Continue eliquis 2.5 mg b.i.d. And metoprolol 12.5 g b.i.d.     Coronary artery disease status post historical coronary artery bypass graftin LIMA to LAD, SVG to PDA and OM 2 with associated historical ischemic cardiomyopathy. Does not appear to be in heart failure currently.  - Continuing chronic anticoagulation as above  - Continues on PTA Hydralazine 25 mg b.i.d., Imdur 30 mg daily, Metoprolol 12.5 mg b.i.d., Rosuvastatin 5 mg daily, Amlodipine 5 mg daily, Aspirin 81 mg daily    Urinary retention with BPH: patient with significant urinary retention two admissions ago with García catheter placed 16. Followed up with urology as an outpatient with removal of García catheter 16.  UA without evidence of infection.  Patient overnight had persistent urinary retention requiring straight catherization x1 for 550cc urine at 0700.  - Repeat bladder scan at 1300, if >300cc urine will place garcía catheter and have follow-up with Urology  - Intake and output  - Continue prior to admission Flomax    DVT Prophylaxis: Warfarin  Code Status: DNR/DNI    Disposition: Expected discharge in 1-2 days once safe disposition established.    Darwin Downs PA-C    Interval History  Pt lying in bed,  "endorsing ongoing weakness.  Not enthused of the idea of a rehabilitation center however states \"whatever my wife thinks.\"  Denies cp, sob, nausea, vomiting.  No dark stools or BRB per rectum.    -Data reviewed today: I reviewed all new labs and imaging results over the last 24 hours. I personally reviewed no images or EKG's today.    Physical Exam  Temp: 97.9  F (36.6  C) Temp src: Oral BP: 125/68 mmHg   Heart Rate: 80 Resp: 16 SpO2: 94 % O2 Device: Nasal cannula Oxygen Delivery: 1 LPM  Filed Vitals:    01/06/17 1527   Weight: 62.2 kg (137 lb 2 oz)     Vital Signs with Ranges  Temp:  [96.2  F (35.7  C)-98.7  F (37.1  C)] 97.9  F (36.6  C)  Heart Rate:  [68-84] 80  Resp:  [16-20] 16  BP: ()/(46-81) 125/68 mmHg  SpO2:  [91 %-97 %] 94 %  I/O last 3 completed shifts:  In: 240 [P.O.:240]  Out: 400 [Urine:400]    GEN: well-developed, thin male, appears comfortable  PULM: lungs CTA bilaterally, no increased work of breathing, no wheeze, crackles, rhonchi  CV: irregularly irregular, S1/S2  GI: soft, nontender, nondistended, +BS in all 4 quadrants  SKIN: pale, warm & dry without rash or wound, no pedal edema    Medications       sodium chloride (PF)  3 mL Intracatheter Q8H     amLODIPine  5 mg Oral Daily     apixaban ANTICOAGULANT  2.5 mg Oral BID     aspirin  81 mg Oral QPM     ferrous sulfate  325 mg Oral Daily     gabapentin  100 mg Oral TID     hydrALAZINE  25 mg Oral BID     ipratropium - albuterol 0.5 mg/2.5 mg/3 mL  3 mL Nebulization 4x Daily     isosorbide mononitrate  30 mg Oral Daily     metoprolol  12.5 mg Oral BID     pantoprazole  40 mg Oral Daily     rosuvastatin (CRESTOR) tablet 5 mg  5 mg Oral Once per day on Mon Wed Fri     tamsulosin  0.4 mg Oral Daily     sodium chloride (PF)  3 mL Intracatheter Q8H     insulin aspart  1-7 Units Subcutaneous TID AC     insulin aspart  1-5 Units Subcutaneous At Bedtime       Data    Recent Labs  Lab 01/07/17  0630 01/06/17  1200   WBC 9.2 7.2   HGB 9.9* 7.0*   MCV 90 " 89    242   INR  --  1.51*   NA  --  145*   POTASSIUM  --  4.2   CHLORIDE  --  109   CO2  --  27   BUN  --  26   CR  --  2.16*   ANIONGAP  --  9   AURORA  --  7.9*   GLC  --  144*   TROPI  --  <0.015The 99th percentile for upper reference range is 0.045 ug/L.  Troponin values in the range of 0.045 - 0.120 ug/L may be associated with risks of adverse clinical events.       Recent Results (from the past 24 hour(s))   Chest XR,  PA & LAT    Narrative    XR CHEST 2 VW  1/6/2017 1:23 PM     HISTORY:  Weakness.      Impression    IMPRESSION: No significant change since 12/23/2016. Extensive  pulmonary infiltration on the right which has not changed appreciably  since 12/23/2016. Pleural thickening and/or fluid on the right.  Previous sternotomy. Mild cardiomegaly. Left lung is clear.    ALONSO GUILLEN MD          Progress Notes by Loretta Moeller LSW at 1/8/2017  3:08 PM     Author:  Loretta Moeller LSW Service:  (none) Author Type:      Filed:  1/8/2017  3:10 PM Note Time:  1/8/2017  3:08 PM Status:  Signed    :  Loretta Moeller LSW ()           SW  I: Faxed referral to [facility], [facility], [place], and [facility], and [facility].    P: SW to follow.        Progress Notes by Jaclyn Heredia RN at 1/8/2017 12:00 PM     Author:  Jaclyn Heredia RN Service:  (none) Author Type:  Registered Nurse    Filed:  1/8/2017 12:25 PM Note Time:  1/8/2017 12:00 PM Status:  Signed    :  Jaclyn Heredia RN (Registered Nurse)           List all goals to be met before discharge home       1) No noted blood in stool or emesis: Met    2) Weakness improved (appropriate for d/c home with wife assist vs safe discharge plan): Not met     3) Hgb >8: Met- last Hgb 9.9         Progress Notes by Jaclyn Heredia RN at 1/8/2017  8:00 AM     Author:  Jaclyn Heredia RN Service:  (none) Author Type:  Registered Nurse    Filed:  1/8/2017   8:59 AM Note Time:  1/8/2017  8:00 AM Status:  Addendum    :  Jaclyn Heredia RN (Registered Nurse)      Related Notes: Original Note by Jaclyn Heredia RN (Registered Nurse) filed at 1/8/2017  8:58 AM         List all goals to be met before discharge home       1) No noted blood in stool or emesis: Met    2) Weakness improved (appropriate for d/c home with wife assist vs safe discharge plan): Not met     3) Hgb >8: Met- last Hgb 9.9         Progress Notes by Luz Maria Cheek RN at 1/8/2017  4:00 AM     Author:  Luz Maria Cheek RN Service:  (none) Author Type:  Registered Nurse    Filed:  1/8/2017  4:05 AM Note Time:  1/8/2017  4:00 AM Status:  Signed    :  Luz Maria Cheek RN (Registered Nurse)           List all goals to be met before discharge home       1) No noted blood in stool or emesis: Met    2) Weakness improved (appropriate for d/c home with wife assist vs safe discharge plan): Not met     3) Hgb >8: Met       Progress Notes by Luz Maria Cheek RN at 1/8/2017 12:00 AM     Author:  Luz Maria Cheek RN Service:  (none) Author Type:  Registered Nurse    Filed:  1/8/2017 12:08 AM Note Time:  1/8/2017 12:00 AM Status:  Signed    :  Luz Maria Cheek RN (Registered Nurse)           List all goals to be met before discharge home       1) No noted blood in stool or emesis: Met    2) Weakness improved (appropriate for d/c home with wife assist vs safe discharge plan): Not met     3) Hgb >8: Met- 9.9 this am.        Progress Notes by Luz Maria Cheek RN at 1/7/2017  8:00 PM     Author:  Luz Maria Cheek RN Service:  (none) Author Type:  Registered Nurse    Filed:  1/7/2017 10:43 PM Note Time:  1/7/2017  8:00 PM Status:  Signed    :  Luz Maria Cheek RN (Registered Nurse)           List all goals to be met before discharge home       1) No noted blood in stool or emesis: Met    2) Weakness improved (appropriate for d/c home with wife assist vs safe discharge plan): Not met     3) Hgb >8: Met-  9.9 this am.        Progress Notes by Gia Strickland RN at 1/7/2017  4:00 PM     Author:  Gia Strickland RN Service:  (none) Author Type:  Registered Nurse    Filed:  1/7/2017  5:47 PM Note Time:  1/7/2017  4:00 PM Status:  Signed    :  Gia Strickland RN (Registered Nurse)           List all goals to be met before discharge home       1) No noted blood in stool or emesis: Met    2) Weakness improved (appropriate for d/c home with wife assist vs safe discharge plan): Not met     3) Hgb >8: Met- 9.9 this am.        Progress Notes by Teresa Crowley, PT at 1/7/2017  3:15 PM     Author:  Teresa Crowley PT Service:  (none) Author Type:  Physical Therapist    Filed:  1/7/2017  5:46 PM Note Time:  1/7/2017  3:15 PM Status:  Signed    :  Teresa Crowley PT (Physical Therapist)            01/07/17 1430   Quick Adds   Type of Visit Initial PT Evaluation   Living Environment   Lives With spouse   Living Arrangements house   Home Accessibility bed and bath on same level;grab bars present (toilet)   Number of Stairs to Enter Home 0  (ramp present)   Number of Stairs Within Home 0   Transportation Available family or friend will provide  (pt does not drive)   Living Environment Comment Pt lives in a single story home with his wife, house is wheel chair accessible, pt is wheel chair bound at baseline. Pt with recent continuous supplemental O2 needs at home.   Self-Care   Dominant Hand right   Usual Activity Tolerance moderate   Current Activity Tolerance fair   Equipment Currently Used at Home wheelchair;power chair   Activity/Exercise/Self-Care Comment Pt not currently receiving therapies, but has many times participated in PT/OT since 11/2016   Functional Level Prior   Ambulation 4-->completely dependent  (wheel chair, pt able to progress without assist)   Transferring 1-->assistive equipment   Toileting 1-->assistive equipment   Bathing 2-->assistive person   Dressing 2-->assistive person   Eating 0-->independent    Communication 0-->understands/communicates without difficulty   Swallowing 0-->swallows foods/liquids without difficulty   Cognition 0 - no cognition issues reported   Fall history within last six months no   Which of the above functional risks had a recent onset or change? transferring   General Information   Onset of Illness/Injury or Date of Surgery - Date 01/06/17   Referring Physician Darwin Downs PA-C   Patient/Family Goals Statement pt and family agreeable to TCU recommendation prior to PT eval   Pertinent History of Current Problem (include personal factors and/or comorbidities that impact the POC) Pt admitted 1/6/17 under observation status for increased generalized weakness for the past three days. Was recently discharged from [facility] following multiple stays 2/2 pneumonia (12/23/16-12/31/16), (12/11/16-12/20/16) and (11/26/16-11/29/16).    Precautions/Limitations fall precautions;oxygen therapy device and L/min  (2L o2)   Weight-Bearing Status - RLE (AKA)   General Observations Pt is pleasant and cooperative, daughter present throughout session   General Info Comments activity: amb with assist   Cognitive Status Examination   Orientation orientation to person, place and time   Level of Consciousness alert   Follows Commands and Answers Questions 100% of the time;able to follow multistep instructions   Personal Safety and Judgment intact   Memory intact   Pain Assessment   Patient Currently in Pain No   Integumentary/Edema   Integumentary/Edema no deficits were identifed   Posture    Posture Forward head position;Protracted shoulders   Range of Motion (ROM)   ROM Comment R hip WFL, L LE WFL against gravity   Strength   Strength Comments L LE 3+/5 at hip, knee and ankle   Bed Mobility   Bed Mobility Comments supine <>sit with CGA and moderate use of bed rail   Transfer Skills   Transfer Comments MinAx1 for bed<>chair transfer (stand pivot)   Gait   Gait Comments pt is nonambulatory at baseline; does not use a  "prosthesis   Balance   Balance Comments fair seated balance, poor standing balance requires B UE support and [place]   Sensory Examination   Sensory Perception no deficits were identified   Sensory Perception Comments per pt report   General Therapy Interventions   Planned Therapy Interventions balance training;bed mobility training;strengthening;transfer training;wheelchair management/propulsion training;progressive activity/exercise   Clinical Impression   Criteria for Skilled Therapeutic Intervention yes, treatment indicated   PT Diagnosis impaired functional mobility   Influenced by the following impairments decreased strength, balance, activity tolerance   Functional limitations due to impairments difficulty with bed mobility, transfers   Clinical Presentation Stable/Uncomplicated   Clinical Presentation Rationale Weakness and deconditioning in setting of multiple recent hospital stays, anemia (responded to transfusion of PRBCs)   Clinical Decision Making (Complexity) Low complexity   Therapy Frequency` 3 times/week   Predicted Duration of Therapy Intervention (days/wks) 3 days   Anticipated Discharge Disposition Transitional Care Facility   Risk & Benefits of therapy have been explained Yes   Patient, Family & other staff in agreement with plan of care Yes   Clinical Impression Comments [facility] OBS Auth email sent for auth 1/7/17-1/9/17   [facility] Facebook TM \"6 Clicks\"   2016, Trustees of [facility], under license to [employer].  All rights reserved.   6 Clicks Short Forms Basic Mobility Inpatient Short Form   [facility] [employer]  \"6 Clicks\" V.2 Basic Mobility Inpatient Short Form   1. Turning from your back to your side while in a flat bed without using bedrails? 3 - A Little   2. Moving from lying on your back to sitting on the side of a flat bed without using bedrails? 3 - A Little   3. Moving to and from a bed to a chair (including a wheelchair)? 3 - A Little   4. Standing up from a chair " using your arms (e.g., wheelchair, or bedside chair)? 3 - A Little   5. To walk in hospital room? 1 - Total   6. Climbing 3-5 steps with a railing? 1 - Total   Basic Mobility Raw Score (Score out of 24.Lower scores equate to lower levels of function) 14   Total Evaluation Time   Total Evaluation Time (Minutes) 10          Progress Notes by Gia Strickland RN at 1/7/2017 12:00 PM     Author:  Gia Strickland RN Service:  (none) Author Type:  Registered Nurse    Filed:  1/7/2017 12:34 PM Note Time:  1/7/2017 12:00 PM Status:  Signed    :  Gia Strickland RN (Registered Nurse)           List all goals to be met before discharge home       1) No noted blood in stool or emesis: Met    2) Weakness improved (appropriate for d/c home with wife assist vs safe discharge plan): Not met     3) Hgb >8: Met- 9.9 this am.        Progress Notes by Gia Strickland RN at 1/7/2017 10:23 AM     Author:  Gia Strickland RN Service:  (none) Author Type:  Registered Nurse    Filed:  1/7/2017 10:24 AM Note Time:  1/7/2017 10:23 AM Status:  Signed    :  Gia Strickland RN (Registered Nurse)           Wife reported to this RN that oxycodone makes the patient loopy and that's why he takes hydrocodone at home. Made PA aware. Was given last dose at 0530 this am, may be the cause of disorientation this am.        Progress Notes by Gia Strickland RN at 1/7/2017  8:00 AM     Author:  Gia Strickland RN Service:  (none) Author Type:  Registered Nurse    Filed:  1/7/2017  8:31 AM Note Time:  1/7/2017  8:00 AM Status:  Signed    :  Gia Strickland RN (Registered Nurse)           List all goals to be met before discharge home       1) No noted blood in stool or emesis: Met    2) Weakness improved (appropriate for d/c home with wife assist vs safe discharge plan): Not met     3) Hgb >8: Met- 9.9 this am.        Progress Notes by Karina Jackson MD at 1/7/2017  6:36 AM     Author:  Karina Jackson MD Service:  Hospitalist Author Type:   Physician    Filed:  1/7/2017  6:37 AM Note Time:  1/7/2017  6:36 AM Status:  Signed    :  Karina Jackson MD (Physician)           Hospitalist x-cover    Paged for straight cath for urinary retention.  Harry Jackson MD       Progress Notes by Luz Maria Cheek RN at 1/7/2017  4:00 AM     Author:  Luz Maria Cheek RN Service:  (none) Author Type:  Registered Nurse    Filed:  1/7/2017  6:35 AM Note Time:  1/7/2017  4:00 AM Status:  Signed    :  Luz Maria Cheek RN (Registered Nurse)           List all goals to be met before discharge home     No noted blood in stool or emesis: Met  Weakness improved (appropriate for d/c home with wife assist vs safe discharge plan): Not met   hgb >8: Not met. Labs taken in AM       Progress Notes by Luz Maria Cheek RN at 1/7/2017 12:00 AM     Author:  Luz Maria Cheek RN Service:  (none) Author Type:  Registered Nurse    Filed:  1/7/2017  2:02 AM Note Time:  1/7/2017 12:00 AM Status:  Signed    :  Lzu Maria Cheek RN (Registered Nurse)           List all goals to be met before discharge home     No noted blood in stool or emesis: Met  Weakness improved (appropriate for d/c home with wife assist vs safe discharge plan): Not met   hgb >8: Not met. Blood transfusion in process        Progress Notes by Indira Yuen RN at 1/6/2017  8:00 PM     Author:  Indira Yuen RN Service:  Observation Author Type:  Registered Nurse    Filed:  1/6/2017 10:49 PM Note Time:  1/6/2017  8:00 PM Status:  Signed    :  Indira Yuen RN (Registered Nurse)           List all goals to be met before discharge home     No noted blood in stool or emesis: Met thus far  Weakness improved (appropriate for d/c home with wife assist vs safe discharge plan): Not met   hgb >8: Not met. Blood transfusion in process        Progress Notes by Indira Yuen RN at 1/6/2017  4:00 PM     Author:  Lb de Fanti, Etta, RN Service:  Observation Author Type:   Registered Nurse    Filed:  1/6/2017  6:12 PM Note Time:  1/6/2017  4:00 PM Status:  Signed    :  Indira Yuen RN (Registered Nurse)           List all goals to be met before discharge home     No noted blood in stool or emesis: Met thus far  Weakness improved (appropriate for d/c home with wife assist vs safe discharge plan): Not met   hgb >8: Not met        Progress Notes by Zamzam Ramirez RN at 1/6/2017  3:26 PM     Author:  Zamzam Ramirez RN Service:  (none) Author Type:  Registered Nurse    Filed:  1/6/2017  3:26 PM Note Time:  1/6/2017  3:26 PM Status:  Signed    :  Zamzam Ramirez RN (Registered Nurse)           [facility]  Patient is currently open to home care services with [place].  The patient is currently receiving RN/PT/OT/HA services.  [facility]  and team have been notified that patient is under OBSERVATION STATUS. [facility] liaison will continue to follow patient during stay.  If patient is admitted to inpatient status please provide orders to resume home care at time of discharge if appropriate.       ED Notes by Gia Strickland RN at 1/6/2017  3:21 PM     Author:  Gia Strickland RN Service:  (none) Author Type:  Registered Nurse    Filed:  1/6/2017  3:21 PM Note Time:  1/6/2017  3:21 PM Status:  Signed    :  Gia Strickland RN (Registered Nurse)           Bed: [place] [address]  Expected date: 1/6/17  Expected time:   Means of arrival:   Comments:  ED Admit       ED Notes by Michelle Mckeon RN at 1/6/2017  2:33 PM     Author:  Michelle Mckeon RN Service:  (none) Author Type:  Registered Nurse    Filed:  1/6/2017  3:07 PM Note Time:  1/6/2017  2:33 PM Status:  Addendum    :  Michelle Mckeon RN (Registered Nurse)      Related Notes: Original Note by Michelle Mckeon RN (Registered Nurse) filed at 1/6/2017  2:44 PM         [facility]  ED Nurse Handoff Report    ED Chief complaint: Generalized Weakness      ED Diagnosis:   Final diagnoses:  "  None       Code Status: DNR / DNI    Allergies:   Allergies   Allergen Reactions     Blood Transfusion Related (Informational Only) Other (See Comments)     Patient has a history of a clinically significant antibody against RBC antigens.  A delay in compatible RBCs may occur.     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  Focused Assessment: arrives via rig. Pt having increased weakness since Wednesday. Pt discharged from [facility] last Saturday for pneumonia. Pt tired but appropriate.denies pain.alert and oriented. Denies pain.comes from home.lives with wife.. occas cough but better.finished antibiotics for pneumonia. . Above knee amputation right. Pt gets around in a w/c  Tests Performed: blood and cxrAbnormal Results sodium 145: hgb 7. Was 8 on 12/31/16/no obvious signs of bleeding. Wife states pt has had an ulcer in the past.  Treatments provided: iv fluids    Family Comments:wife here    OBS brochure/video discussed/provided to patient: N/A    ED " Medications:   Medications   lidocaine 1 % 1 mL (not administered)   lidocaine (LMX4) cream (not administered)   sodium chloride (PF) 0.9% PF flush 3 mL (not administered)   sodium chloride (PF) 0.9% PF flush 3 mL (not administered)       Drips infusing?:  No      ED NURSE PHONE NUMBER:   [phone]              ED Notes by Mike Blakely RN at 1/6/2017 11:51 AM     Author:  Mike Blakely RN Service:  (none) Author Type:  Registered Nurse    Filed:  1/6/2017 11:51 AM Note Time:  1/6/2017 11:51 AM Status:  Signed    :  Mike Blakely RN (Registered Nurse)           Bed: ED24  Expected date: 1/6/17  Expected time: 11:32 AM  Means of arrival: Ambulance  Comments:  N710 81M weakness             Procedure Notes     No notes of this type exist for this encounter.         Progress Notes - Therapies (Notes from 01/06/17 through 01/09/17)      Progress Notes by Teresa Crowley PT at 1/7/2017  3:15 PM     Author:  Teresa Crowley PT Service:  (none) Author Type:  Physical Therapist    Filed:  1/7/2017  5:46 PM Note Time:  1/7/2017  3:15 PM Status:  Signed    :  Teresa Crowley PT (Physical Therapist)            01/07/17 1430   Quick Adds   Type of Visit Initial PT Evaluation   Living Environment   Lives With spouse   Living Arrangements house   Home Accessibility bed and bath on same level;grab bars present (toilet)   Number of Stairs to Enter Home 0  (ramp present)   Number of Stairs Within Home 0   Transportation Available family or friend will provide  (pt does not drive)   Living Environment Comment Pt lives in a single story home with his wife, house is wheel chair accessible, pt is wheel chair bound at baseline. Pt with recent continuous supplemental O2 needs at home.   Self-Care   Dominant Hand right   Usual Activity Tolerance moderate   Current Activity Tolerance fair   Equipment Currently Used at Home wheelchair;power chair   Activity/Exercise/Self-Care Comment Pt not currently  receiving therapies, but has many times participated in PT/OT since 11/2016   Functional Level Prior   Ambulation 4-->completely dependent  (wheel chair, pt able to progress without assist)   Transferring 1-->assistive equipment   Toileting 1-->assistive equipment   Bathing 2-->assistive person   Dressing 2-->assistive person   Eating 0-->independent   Communication 0-->understands/communicates without difficulty   Swallowing 0-->swallows foods/liquids without difficulty   Cognition 0 - no cognition issues reported   Fall history within last six months no   Which of the above functional risks had a recent onset or change? transferring   General Information   Onset of Illness/Injury or Date of Surgery - Date 01/06/17   Referring Physician Darwin Downs PA-C   Patient/Family Goals Statement pt and family agreeable to TCU recommendation prior to PT eval   Pertinent History of Current Problem (include personal factors and/or comorbidities that impact the POC) Pt admitted 1/6/17 under observation status for increased generalized weakness for the past three days. Was recently discharged from [facility] following multiple stays 2/2 pneumonia (12/23/16-12/31/16), (12/11/16-12/20/16) and (11/26/16-11/29/16).    Precautions/Limitations fall precautions;oxygen therapy device and L/min  (2L o2)   Weight-Bearing Status - RLE (AKA)   General Observations Pt is pleasant and cooperative, daughter present throughout session   General Info Comments activity: amb with assist   Cognitive Status Examination   Orientation orientation to person, place and time   Level of Consciousness alert   Follows Commands and Answers Questions 100% of the time;able to follow multistep instructions   Personal Safety and Judgment intact   Memory intact   Pain Assessment   Patient Currently in Pain No   Integumentary/Edema   Integumentary/Edema no deficits were identifed   Posture    Posture Forward head position;Protracted shoulders   Range of Motion (ROM)  "  ROM Comment R hip WFL, L LE WFL against gravity   Strength   Strength Comments L LE 3+/5 at hip, knee and ankle   Bed Mobility   Bed Mobility Comments supine <>sit with CGA and moderate use of bed rail   Transfer Skills   Transfer Comments MinAx1 for bed<>chair transfer (stand pivot)   Gait   Gait Comments pt is nonambulatory at baseline; does not use a prosthesis   Balance   Balance Comments fair seated balance, poor standing balance requires B UE support and [place]   Sensory Examination   Sensory Perception no deficits were identified   Sensory Perception Comments per pt report   General Therapy Interventions   Planned Therapy Interventions balance training;bed mobility training;strengthening;transfer training;wheelchair management/propulsion training;progressive activity/exercise   Clinical Impression   Criteria for Skilled Therapeutic Intervention yes, treatment indicated   PT Diagnosis impaired functional mobility   Influenced by the following impairments decreased strength, balance, activity tolerance   Functional limitations due to impairments difficulty with bed mobility, transfers   Clinical Presentation Stable/Uncomplicated   Clinical Presentation Rationale Weakness and deconditioning in setting of multiple recent hospital stays, anemia (responded to transfusion of PRBCs)   Clinical Decision Making (Complexity) Low complexity   Therapy Frequency` 3 times/week   Predicted Duration of Therapy Intervention (days/wks) 3 days   Anticipated Discharge Disposition Transitional Care Facility   Risk & Benefits of therapy have been explained Yes   Patient, Family & other staff in agreement with plan of care Yes   Clinical Impression Comments [facility] OBS Auth email sent for auth 1/7/17-1/9/17   [facility] [employer] \"6 Clicks\"   2016, Trustees of [facility], under license to [employer].  All rights reserved.   6 Clicks Short Forms Basic Mobility Inpatient Short Form   [facility] [employer]  \"6 Clicks\" " V.2 Basic Mobility Inpatient Short Form   1. Turning from your back to your side while in a flat bed without using bedrails? 3 - A Little   2. Moving from lying on your back to sitting on the side of a flat bed without using bedrails? 3 - A Little   3. Moving to and from a bed to a chair (including a wheelchair)? 3 - A Little   4. Standing up from a chair using your arms (e.g., wheelchair, or bedside chair)? 3 - A Little   5. To walk in hospital room? 1 - Total   6. Climbing 3-5 steps with a railing? 1 - Total   Basic Mobility Raw Score (Score out of 24.Lower scores equate to lower levels of function) 14   Total Evaluation Time   Total Evaluation Time (Minutes) 10

## 2017-01-06 NOTE — ED NOTES
Bed: ED24  Expected date: 1/6/17  Expected time: 11:32 AM  Means of arrival: Ambulance  Comments:  N710 81M weakness

## 2017-01-06 NOTE — ED AVS SNAPSHOT
[facility] Observation Unit    [address] [address]    Phone:  [phone]                                       Jossue Mendes   MRN: [id number]    Department:  [facility]   Date of Visit:  1/6/2017           After Visit Summary Signature Page     I have received my discharge instructions, and my questions have been answered. I have discussed any challenges I see with this plan with the nurse or doctor.    ..........................................................................................................................................  Patient/Patient Representative Signature      ..........................................................................................................................................  Patient Representative Print Name and Relationship to Patient    ..................................................               ................................................  Date                                            Time    ..........................................................................................................................................  Reviewed by Signature/Title    ...................................................              ..............................................  Date                                                            Time

## 2017-01-06 NOTE — ED AVS SNAPSHOT
[facility]    [address] [facility] [address]    Phone:  [phone]                                       Jossue Mendes   MRN: [id number]    Department:  [facility]   Date of Visit:  1/6/2017           Patient Information     Date Of Birth          1935        Your diagnoses for this visit were:     Weakness     Pain     Anxiety        You were seen by Kevin Orozco MD and Juan Quan MD.        Discharge Instructions       CM  You are discharging to:    [facility]  [address] [place] [address], [place]  [phone]      Future Appointments        Provider Department Dept Phone Center    1/17/2017 10:45 AM Tani Wang MD [facility] [phone] [facility]    1/18/2017 8:20 AM Konstantin Rabago MD, MD [facility] [phone] [facility]    1/26/2017 8:30 AM [facility] VASCULAR ULTRASOUND ROOM 2 [facility] Ultrasound [phone] [facility]    1/26/2017 9:30 AM [facility] VASCULAR ULTRASOUND ROOM 2 [facility] Ultrasound [phone] [facility]    1/26/2017 10:30 AM Rj Quan MD [facility] [phone] [facility]    1/26/2017 1:00 PM Nelia Jenkins MD [facility] [phone] [place]    3/8/2017 10:50 AM MIREYA VILLA [facility] [place] [phone] [facility]      24 Hour Appointment Hotline       To make an appointment at any [facility], call [phone] ([phone]). If you don't have a family doctor or clinic, we will help you find one. [facility] ([facility]] are conveniently located to serve the needs of you and your family.          ED Discharge Orders     Activity - Up with nursing assistance           Advance Diet as Tolerated       Follow this diet upon discharge: Orders Placed This Encounter  Combination Diet Regular Diet Adult; Renal Diet            Bladder  scan       X 2 for post void residual, straight cath per protocol            Fall precautions           Follow Up and recommended labs and tests       Follow up with primary care provider in 1-2 weeks.  The following labs/tests are recommended: repeat Hemoglobin.  Follow up with specialist, Urology, in 1-2 weeks.  No follow up labs or test are needed.            Follow Up and recommended labs and tests       You are scheduled to see Dr. Wang on Tuesday, January 17th at 10:45 AM.  This appointment is at the [facility] in the [address] [place] [address].  [address] [place] [address]  [place] [address]  [phone]            General info for SNF       Length of Stay Estimate: Short Term Care: Estimated # of Days <30  Condition at Discharge: Stable  Level of care:skilled   Rehabilitation Potential: Good  Admission H&P remains valid and up-to-date: Yes  Recent Chemotherapy: N/A  Use Nursing Home Standing Orders: Yes            Mantoux instructions       Give two-step Mantoux (PPD) Per Facility Policy Yes            Occupational Therapy Adult Consult       Evaluate and treat as clinically indicated.    Reason:  Generalized weakness            Oxygen - Nasal cannula       1 Lpm by nasal cannula to keep O2 sats 92% or greater.            Oxygen - Nasal cannula       1 Lpm by nasal cannula to keep O2 sats 92% or greater. Has home O2.            Physical Therapy Adult Consult       Evaluate and treat as clinically indicated.    Reason:  Generalized weakness            Reason for your hospital stay       You were admitted due to worsening weakness at home.  You were found to have a low hemoglobin level and received two units of blood with improvement.  You were also found to be very weak and recommended to discharge to a rehab facility before going home.                     Review of your medicines      Our records show that you are taking the medicines listed below. If these are incorrect, please call your family doctor or  clinic.        Dose / Directions Last dose taken    ACETAMINOPHEN PO   Dose:  1000 mg        Take 1,000 mg by mouth 3 times daily as needed for pain   Refills:  0        albuterol (2.5 MG/3ML) 0.083% neb solution   Dose:  3 mL   Quantity:  360 mL        Take 1 vial (2.5 mg) by nebulization every 2 hours as needed for wheezing or shortness of breath / dyspnea   Refills:  0        amLODIPine 5 MG tablet   Commonly known as:  NORVASC   Dose:  5 mg   Quantity:  90 tablet        Take 1 tablet (5 mg) by mouth daily   Refills:  3        apixaban ANTICOAGULANT 2.5 MG tablet   Commonly known as:  ELIQUIS   Dose:  2.5 mg   Quantity:  60 tablet        Take 1 tablet (2.5 mg) by mouth 2 times daily   Refills:  11        aspirin 81 MG chewable tablet   Dose:  81 mg        Take 81 mg by mouth every evening   Refills:  0        calcium 600 MG tablet   Dose:  1 tablet        Take 1 tablet by mouth 2 times daily   Refills:  0        clonazePAM 0.5 MG tablet   Commonly known as:  klonoPIN   Dose:  0.25 mg   Quantity:  30 tablet        Take 0.5 tablets (0.25 mg) by mouth 2 times daily as needed for anxiety   Refills:  0        CRESTOR PO   Dose:  5 mg        Take 5 mg by mouth Mon, Wed, Fri evening   Refills:  0        ferrous sulfate 325 (65 FE) MG tablet   Commonly known as:  IRON   Dose:  325 mg        Take 325 mg by mouth daily   Refills:  0        gabapentin 100 MG capsule   Commonly known as:  NEURONTIN   Dose:  100 mg   Quantity:  270 capsule        Take 1 capsule (100 mg) by mouth 3 times daily   Refills:  3        guaiFENesin 100 MG/5ML Syrp   Commonly known as:  ROBITUSSIN   Dose:  5 mL        Take 5 mLs by mouth every 6 hours as needed for cough   Refills:  0        hydrALAZINE 25 MG tablet   Commonly known as:  APRESOLINE   Dose:  25 mg   Quantity:  540 tablet        Take 25 mg by mouth 2 times daily   Refills:  3        HYDROcodone-acetaminophen 7.5-325 MG per tablet   Commonly known as:  NORCO   Dose:  0.5 tablet    Quantity:  45 tablet        Take 0.5 tablets by mouth 3 times daily as needed for moderate to severe pain Take 0.5 tablets by mouth 3 times daily as needed for moderate to severe pain   Refills:  0        IMODIUM OR   Dose:  1-2 tablet        Take 1-2 tablets by mouth daily as needed (for loose stools)   Refills:  0        ipratropium - albuterol 0.5 mg/2.5 mg/3 mL 0.5-2.5 (3) MG/3ML neb solution   Commonly known as:  DUONEB   Dose:  3 mL   Quantity:  360 mL        Take 1 vial (3 mLs) by nebulization 4 times daily   Refills:  0        isosorbide mononitrate 30 MG 24 hr tablet   Commonly known as:  IMDUR   Dose:  30 mg   Quantity:  90 tablet        Take 1 tablet (30 mg) by mouth daily   Refills:  3        metoprolol 25 MG tablet   Commonly known as:  LOPRESSOR   Dose:  12.5 mg   Quantity:  60 tablet        Take 0.5 tablets (12.5 mg) by mouth 2 times daily   Refills:  1        nitroglycerin 0.4 MG sublingual tablet   Commonly known as:  NITROSTAT   Dose:  0.4 mg   Quantity:  90 tablet        Place 1 tablet under the tongue every 5 minutes as needed for chest pain.   Refills:  0        order for DME   Quantity:  1 Units        Equipment being ordered: Nebulizer   Refills:  0        pantoprazole 40 MG EC tablet   Commonly known as:  PROTONIX   Dose:  40 mg   Quantity:  90 tablet        Take 1 tablet (40 mg) by mouth daily Take 30-60 minutes before a meal.   Refills:  3        tamsulosin 0.4 MG capsule   Commonly known as:  FLOMAX   Dose:  0.4 mg   Quantity:  90 capsule        Take 1 capsule (0.4 mg) by mouth daily   Refills:  3        VITAMIN D (CHOLECALCIFEROL) PO   Dose:  2000 Units        Take 2,000 Units by mouth daily   Refills:  0                Prescriptions were sent or printed at these locations (2 Prescriptions)                   Other Prescriptions                Printed at Department/Unit printer (2 of 2)         HYDROcodone-acetaminophen (NORCO) 7.5-325 MG per tablet               clonazePAM (KLONOPIN)  0.5 MG tablet                Procedures and tests performed during your visit     Procedure/Test Number of Times Performed    ABO/Rh type and screen 1    Admission Med Rec Marker for reporting and best practice alerts 1    Basic metabolic panel 1    Blood component 1    Blood component 1    Blood component 1    Blood component 1    CBC with platelets 1    CBC with platelets differential 1    Chest XR,  PA & LAT 1    ED Bed Request 1    EKG 12-lead, tracing only 1    Ferritin 1    Glucose by meter 10    Hemoglobin 1    Hemoglobin A1c 1    I have reviewed and agree with all the recommendations and orders detailed in this document. 1    INR 1    Iron and iron binding capacity 1    Occult blood stool (ED Lab POCT Only 3-11) 1    Peripheral IV: Standard 1    Physical Therapy Adult IP Consult 1    Red blood cell prepare order unit 1    [place] to Observation 1    Reticulocyte count 1    Social Work IP Consult 1    Transferrin 1    Transfuse red blood cell unit 2    Troponin I 1    UA with Microscopic reflex to Culture 1      Orders Needing Specimen Collection     None      Pending Results     No orders found from 1/5/2017 to 1/7/2017.            Pending Culture Results     No orders found from 1/5/2017 to 1/7/2017.            Thank you for choosing [facility]       Thank you for choosing [facility] for your care. Our goal is always to provide you with excellent care. Hearing back from our patients is one way we can continue to improve our services. Please take a few minutes to complete the written survey that you may receive in the mail after you visit with us. Thank you!        Statement of Approval     Ordered          01/09/17 0857  I have reviewed and agree with all the recommendations and orders detailed in this document.   EFFECTIVE NOW     Approved and electronically signed by:  Ping Hunt PA-C MyChart Information     [employer] lets you send messages to your doctor, view your test results,  "renew your prescriptions, schedule appointments and more. To sign up, go to [email or web address] . Click on \"Log in\" on the left side of the screen, which will take you to the Welcome page. Then click on \"Sign up Now\" on the right side of the page.     You will be asked to enter the access code listed below, as well as some personal information. Please follow the directions to create your username and password.     Your access code is: [email or web address]  Expires: 2017 11:57 AM     Your access code will  in 90 days. If you need help or a new code, please call your [facility] clinic or [phone].        Care EveryWhere ID     This is your Care EveryWhere ID. This could be used by other organizations to access your [facility] medical records          After Visit Summary       This is your record. Keep this with you and show to your community pharmacist(s) and doctor(s) at your next visit.                  "

## 2017-01-06 NOTE — ED AVS SNAPSHOT
"` `           [facility] OBSERVATION UNIT: [phone]                                              INTERAGENCY TRANSFER FORM - NURSING   2017                    Hospital Admission Date: 2017  JOHN ALVAREZ   : 1935  Sex: Male        Attending Provider: Juan Quan MD     Allergies:  Blood Transfusion Related (Informational Only), Remeron Soltab, Methotrexate, Atorvastatin, Bystolic, Cefuroxime, Clonidine, Relafen, Temazepam, Tramadol, Clindamycin, Indocin, Levaquin, Penicillin G, Prilosec    Infection:  MRSA-Contact Isolation, VRE-Contact Isolation   Service:  HOSPITALIST    Ht:  1.753 m (5' 9.02\")   Wt:  62.5 kg (137 lb 12.6 oz)   Admission Wt:  62.2 kg (137 lb 2 oz)    BMI:  20.34 kg/m 2   BSA:  1.74 m 2            Patient PCP Information     Provider PCP Type    Konstantin Rabago MD, MD General      Current Code Status     Date Active Code Status Order ID Comments User Context       2017  4:07 PM DNR/DNI [id number]  Juan Quan MD ED       Code Status History     Date Active Date Inactive Code Status Order ID Comments User Context    2016 10:43 AM 2017  4:07 PM DNR/DNI [id number]  Basilio Mccormick MD Outpatient    2016  3:26 PM 2016 10:43 AM DNR/DNI [id number]  Monique Arellano MD Inpatient    2016 10:58 AM 2016  3:26 PM DNR/DNI [id number]  Santiago Mccall DO Outpatient    2016 11:16 PM 2016 10:58 AM DNR/DNI [id number]  Woody Reyna MD Inpatient    2016  3:51 PM 2016 11:16 PM DNR/DNI [id number]  Jg Salinas MD Outpatient    2016  7:29 PM 2016  3:51 PM DNR/DNI [id number]  Rajwinder Thompson MD Inpatient    2016  1:57 PM 2016  7:29 PM DNR/DNI [id number] DNR/DNI accord with POLST 16 - and Health Care Directive 12. Stephani Dimas, [facility] Outpatient    2016  1:39 PM 2016  1:56 PM DNR [id number] DNR accord with POLST 16 - and Health Care Directive 12. Stephani Dimas, " CMA Outpatient    4/15/2016  1:03 PM 4/19/2016  7:50 PM DNR/DNI [id number]  Urmila Craven MD Inpatient    3/29/2016 11:49 AM 4/15/2016  1:03 PM DNR/DNI [id number]  Martinez Pizarro, DO Outpatient    3/26/2016 11:17 AM 3/29/2016 11:49 AM DNR/DNI [id number]  CalSantiago Vane, DO Inpatient    1/27/2016 10:05 AM 3/26/2016 11:17 AM DNR/DNI [id number]  Althea Boateng MD Outpatient    1/24/2016  4:35 PM 1/27/2016 10:05 AM DNR/DNI [id number]  Althea Boateng MD Inpatient    1/9/2016 12:15 PM 1/24/2016  4:35 PM Full Code [id number]  Humphrey Cruz MD Outpatient    1/7/2016  2:32 PM 1/9/2016 12:15 PM Full Code [id number]  Anam Cuenca PA-C Inpatient    11/23/2015 12:14 PM 1/7/2016  2:32 PM Full Code [id number]  Ulises Pascual MD Outpatient    11/20/2015  2:37 PM 11/23/2015 12:14 PM DNR/DNI [id number]  Ping Hunt PA-C Inpatient    8/22/2015 12:35 PM 11/20/2015  2:37 PM DNR/DNI [id number]  Bentley Banda MD Outpatient    8/19/2015  5:47 PM 8/22/2015 12:35 PM DNR/DNI [id number]  Maggie Mckay MD Inpatient    8/19/2015  5:07 PM 8/19/2015  5:47 PM DNI [id number]  Maggie Mckay MD Inpatient    3/25/2015 10:13 AM 8/19/2015  5:07 PM DNI [id number]  Althea Boateng MD Outpatient    3/22/2015  1:51 PM 3/25/2015 10:13 AM DNI [id number]  Neha Stiles MD Inpatient    1/1/2015 11:52 AM 3/22/2015  1:51 PM DNI [id number]  Dillon Hernandez MD Outpatient    12/31/2014  7:43 PM 1/1/2015 11:52 AM DNI [id number]  Floyd Nunez MD Inpatient    6/18/2014 12:46 PM 6/19/2014  4:28 PM DNI [id number]  Woody Reyna MD Inpatient    6/18/2014 12:38 PM 6/18/2014 12:46 PM DNR/DNI [id number]  Woody Reyna MD Inpatient    6/10/2014  3:27 PM 6/18/2014 12:38 PM DNR/DNI [id number]  Yazan Bhatt MD Outpatient    6/10/2014  1:39 PM 6/10/2014  3:27 PM DNR/DNI [id number]  Yazan Bhatt MD Inpatient    4/2/2013  2:32 PM 6/10/2014  1:39 PM DNR/DNI [id number]  Dwaine  Cherri PICKETT MD Outpatient    4/1/2013  1:35 PM 4/2/2013  2:32 PM DNR/DNI [id number]  Zoila Rivera, RN Inpatient    10/4/2012 11:06 AM 4/1/2013  1:35 PM Full Code [id number]  Anisha Barber PA Outpatient    9/13/2012  2:49 PM 9/16/2012  2:45 PM DNR/DNI [id number]  Florinda Lucio, RN Inpatient    6/17/2012  8:38 AM 9/13/2012  2:49 PM DNI [id number]  Louisa Abdi MD Outpatient    6/17/2012  8:28 AM 6/17/2012  8:38 AM Full Code [id number]  Louisa Abdi MD Outpatient    6/15/2012  2:29 PM 6/17/2012  8:28 AM DNI [id number]  Veronica Garcia, RN Inpatient    2/28/2012  3:06 PM 2/29/2012  4:26 PM DNR/DNI [id number]  Joseph Charles MD Inpatient    2/24/2012  5:05 PM 2/28/2012  3:06 PM Full Code [id number]  Joseph Charles MD Inpatient    1/15/2012  7:46 PM 1/19/2012  2:58 PM DNR/DNI [id number]  Ana Orozco, ASHA Inpatient      Advance Directives        Does patient have a scanned Advance Directive/ACP document in EPIC?           Yes        Hospital Problems as of 1/9/2017              Priority Class Noted POA    Weakness Medium  1/6/2017 Yes      Non-Hospital Problems as of 1/9/2017              Priority Class Noted    Abdominal aortic aneurysm (H)   2/25/2012    Hyperlipidemia LDL goal <70   2/25/2012    CAD (coronary artery disease)   2/28/2012    Acute myocardial infarction of other inferior wall, episode of care unspecified   9/12/2012    CVA (cerebral vascular accident) (H)   4/1/2013    Cardiomyopathy (H)   Unknown    GI bleed   6/18/2014    CKD (chronic kidney disease) stage 3, GFR 30-59 ml/min   7/25/2014    Restless legs syndrome (RLS)   7/25/2014    Nonproliferative diabetic retinopathy of both eyes, without macular edema   10/7/2014    Branch retinal vein occlusion of left eye   10/7/2014    Cerebral infarction (H) Medium  8/19/2015    ACP (advance care planning)   8/20/2015    Atrial fibrillation (H) Medium  Unknown    Microalbuminuria Medium  10/24/2015    Type  2 diabetes mellitus with diabetic chronic kidney disease (H) Medium  10/24/2015    S/P AKA (above knee amputation) (H) Medium  Unknown    Cataracts, both eyes Medium  2/1/2016    Gastroesophageal reflux disease without esophagitis Medium  5/18/2016    Pleural effusion Medium  7/15/2016    Asbestos-induced pleural plaque Medium  7/15/2016    Type 2 diabetes mellitus with stage 3 chronic kidney disease (H) Medium  7/27/2016    Type 2 diabetes mellitus with stage 3 chronic kidney disease, without long-term current use of insulin (H) Medium  10/18/2016    Pseudophakia of right eye Medium  10/21/2016    Aspiration pneumonia (H) Medium  11/26/2016    Pneumonia of right lower lobe due to infectious organism Medium  11/29/2016    Pneumonia Medium  12/11/2016    Sepsis (H) Medium  12/23/2016      Immunizations     Name Date      Pneumococcal (PCV 13) 07/27/16     Pneumococcal 23 valent 03/23/15     TD (ADULT, 7+) 12/01/15          END      ASSESSMENT     Discharge Profile Flowsheet     EXPECTED DISCHARGE     GASTROINTESTINAL (ADULT,PEDIATRIC,OB)      Expected Discharge Date  01/09/17 01/09/17 0856   Last Bowel Movement  12/30/16 12/30/16 1606    DISCHARGE NEEDS ASSESSMENT     COMMUNICATION ASSESSMENT      Concerns To Be Addressed  discharge planning concerns 01/02/17 1147   Patient's communication style  spoken language (English or Bilingual) 01/06/17 1151    Concerns Comments  Readmission. Denies concerns but agrees to F/U. Home care order placed. 05/20/16 1052   FINAL RESOURCES      Patient/family verbalizes understanding of discharge plan recommendations?  Yes 01/09/17 0856   Resources List  Transitional Care 01/09/17 0856    Medical Team notified of plan?  yes 01/09/17 0856   Other Resources  Other (see comment) 01/02/17 1147    Equipment Currently Used at Home  wheelchair;power chair 01/07/17 1523   PAS Number  [id number] 01/09/17 0856    Transportation Available  van, wheelchair accessible 01/09/17 0856   Senior Linkage  "Line Referral Placed  03/29/16 01/02/17 1147    # of Referrals Placed by CTS  Post Acute Facilities 01/09/17 0856   Referrals Placed  Post Acute Facilities 01/02/17 1147    Does Patient Need a Referral for Clinic CC  Yes 01/29/16 1300   Existing Resources/Services  None 04/06/15 1257    Equipment Used at Home  wheelchair;grab bar (manual wheelchair which he is able to self propel) 03/27/16 1413                      Assessment WDL (Within Defined Limits) Definitions           Safety WDL     Effective: 09/28/15    Row Information: <b>WDL Definition:</b> Bed in low position, wheels locked; call light in reach; upper side rails up x 2; ID band on<br> <font color=\"gray\"><i>Item=AS safety wdl>>List=AS safety wdl>>Version=F14</i></font>      Skin WDL     Effective: 09/28/15    Row Information: <b>WDL Definition:</b> Warm; dry; intact; elastic; without discoloration; pressure points without redness<br> <font color=\"gray\"><i>Item=AS skin wdl>>List=AS skin wdl>>Version=F14</i></font>      Vitals     Vital Signs Flowsheet     VITAL SIGNS     ANALGESIA SIDE EFFECTS MONITORING      Temp  97.2  F (36.2  C) 01/09/17 0753   Side Effects Monitoring: Respiratory Quality  R 01/08/17 2223    Temp src  Oral 01/09/17 0753   Side Effects Monitoring: Respiratory Depth  N 01/08/17 2223    Resp  16 01/09/17 0753   Side Effects Monitoring: Sedation Level  1 01/08/17 2223    Heart Rate  84 01/09/17 0753   HEIGHT AND WEIGHT      Pulse/Heart Rate Source  Monitor 01/09/17 0753   Height  1.753 m (5' 9.02\") 01/06/17 1529    BP  151/65 mmHg 01/09/17 0753   Height Method  Stated 01/06/17 1159    BP Location  Right arm 01/09/17 0753   Weight  62.5 kg (137 lb 12.6 oz) (OBS Bed wt) 01/08/17 0705    OXYGEN THERAPY     BSA (Calculated - sq m)  1.74 01/06/17 1529    SpO2  96 % 01/09/17 0901   BMI (Calculated)  20.28 01/06/17 1529    O2 Device  Nasal cannula 01/09/17 0901   POSITIONING      Oxygen Delivery  2 LPM 01/09/17 0901   Body Position  independently " positioning 01/08/17 2223    PAIN/COMFORT     Head of Bed (HOB)  HOB at 30 degrees 01/08/17 2223    Patient Currently in Pain  denies 01/08/17 2106   PHILIP COMA SCALE      Preferred Pain Scale  number (Numeric Rating Pain Scale) 01/08/17 1316   Best Eye Response  4-->(E4) spontaneous 01/07/17 0202    0-10 Pain Scale  10 01/09/17 1035   Best Motor Response  6-->(M6) obeys commands 01/07/17 0202    Pain Location  Leg 01/08/17 1414   Best Verbal Response  5-->(V5) oriented 01/07/17 0202    Pain Orientation  Right;Left 01/08/17 1414   Philip Coma Scale Score  15 01/07/17 0202    Pain Descriptors  Aching (jumping leg) 01/06/17 1750   DAILY CARE      Pain Intervention(s)  Medication (See eMAR) 01/08/17 1316   Activity Type  up in chair 01/08/17 1708    Response to Interventions  Decrease in pain 01/08/17 1414   Activity Level of Assistance  other (see comments) (lift) 01/08/17 2223            Patient Lines/Drains/Airways Status    Active LINES/DRAINS/AIRWAYS     Name: Placement date: Placement time: Site: Days: Last dressing change:    Urethral Catheter Coude 16 fr 12/20/16  1644  Coude  19     Wound 04/15/16 Left Foot Black necrotic tissue top of 2nd toe 04/15/16  0353  Foot  269     Wound 04/15/16 Left Foot black necrotic tissue left side of 4th toe 04/15/16  0354  Foot  269     Wound 11/29/16 Left Toe (Comment  which one) Other (comment) Blister 2nd and 4th toe top knuckle  11/29/16  0151  Toe (Comment  which one)  41     Incision/Surgical Site 10/20/16 Right Eye 10/20/16  1232   80             Patient Lines/Drains/Airways Status    Active PICC/CVC     **None**            Intake/Output Detail Report     Date Intake         Output Net    Shift P.O. I.V. IV Piggyback Red Blood Cells Blood Components Total Urine Total       Noc 01/07/17 2300 - 01/08/17 0659 -- -- -- -- -- -- -- -- 0    Day 01/08/17 0700 - 01/08/17 1459 180 -- -- -- -- 180 650 650 -290    Treva 01/08/17 1500 - 01/08/17 2259 -- -- -- -- -- -- 50 50 -50     Noc 01/08/17 2300 - 01/09/17 0659 -- -- -- -- -- -- 475 475 -475    Day 01/09/17 0700 - 01/09/17 1459 -- -- -- -- -- -- 0 -- 0      Last Void/BM       Most Recent Value    Urine Occurrence 1 at 01/09/2017 0700    Stool Occurrence       Case Management/Discharge Planning     Case Management/Discharge Planning Flowsheet     REFERRAL INFORMATION     Medical Team notified of plan?  yes 01/09/17 0856    Arrived From  home or self-care 12/16/16 1211   Transportation Available  van, wheelchair accessible 01/09/17 0856    # of Referrals Placed by CTS  Post Acute Facilities 01/09/17 0856   Does Patient Need a Referral for Clinic CC  Yes 01/29/16 1300    Primary Care Clinic Name  [facility] 04/19/16 1605   Skilled Nursing Facility  [facility]  10/04/12 1127    Primary Care MD Name  DOM [facility] 04/19/16 1605   Equipment Used at Home  wheelchair;grab bar (manual wheelchair which he is able to self propel) 03/27/16 1413    LIVING ENVIRONMENT     FINAL RESOURCES      Lives With  spouse 01/07/17 1515   Equipment Currently Used at Home  wheelchair;power chair 01/07/17 1523    Living Arrangements  house 01/07/17 1521   Resources List  Transitional Care 01/09/17 0856    Provides Primary Care For  no one 12/16/16 1211   Other Resources  Other (see comment) 01/02/17 1147    COPING/STRESS     PAS Number  [id number] 01/09/17 0856    Major Change/Loss/Stressor  none 12/23/16 1604   Senior Linkage Line Referral Placed  03/29/16 01/02/17 1147    Patient Personal Strengths  expressive of needs;motivated;positive attitude;strong support system 12/16/16 1211   Referrals Placed  Post Acute Facilities 01/02/17 1147    EXPECTED DISCHARGE     Existing Resources/Services  None 04/06/15 1257    Expected Discharge Date  01/09/17 01/09/17 0856   ABUSE RISK SCREEN      ASSESSMENT/CONCERNS TO BE ADDRESSED     QUESTION TO PATIENT:  Has a member of your family or a partner(now or in the past) intimidated, hurt, manipulated, or controlled you in any way?   no 01/06/17 1156    Concerns To Be Addressed  discharge planning concerns 01/02/17 1147   QUESTION TO PATIENT: Do you feel safe going back to the place where you are living?  yes 01/06/17 1156    Concerns Comments  Readmission. Denies concerns but agrees to F/U. Home care order placed. 05/20/16 1052   OBSERVATION: Is there reason to believe there has been maltreatment of a vulnerable adult (ie. Physical/Sexual/Emotional abuse, self neglect, lack of adequate food, shelter, medical care, or financial exploitation)?  no 01/06/17 1156    DISCHARGE PLANNING     (R) MENTAL HEALTH SUICIDE RISK      Patient/family verbalizes understanding of discharge plan recommendations?  Yes 01/09/17 0856   Are you depressed or being treated for depression?  No 01/06/17 8088

## 2017-01-06 NOTE — TELEPHONE ENCOUNTER
Reason for Call:  Other     Detailed comments: Wife asking if home health rn can draw blood and bring in pt is too sick to come to clinic, please call back and advise    Phone Number Patient can be reached at: Home number on file [phone] (home)   Sending HIGH PRiority this is urgent request for something needed to be done today asap. Wife will call home health if approved to stop by to draw the blood but she will only come by if it has been approved by md first.    Best Time: any    Can we leave a detailed message on this number? YES    Call taken on 1/6/2017 at 9:19 AM by Arin Warren

## 2017-01-06 NOTE — ED NOTES
"[facility]  ED Nurse Handoff Report    ED Chief complaint: Generalized Weakness      ED Diagnosis:   Final diagnoses:   None       Code Status: DNR / DNI    Allergies:   Allergies   Allergen Reactions     Blood Transfusion Related (Informational Only) Other (See Comments)     Patient has a history of a clinically significant antibody against RBC antigens.  A delay in compatible RBCs may occur.     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  Focused Assessment: arrives via rig. Pt having increased weakness since Wednesday. Pt discharged from [facility] last Saturday for pneumonia. Pt tired but appropriate.denies pain.alert and oriented. Denies pain.comes from home.lives with wife.. occas cough but better.finished antibiotics for pneumonia. . Above knee amputation right. Pt gets around in a w/c  Tests Performed: blood and cxrAbnormal Results sodium 145: hgb 7. Was 8 on 12/31/16/no obvious signs of bleeding. Wife states pt has had an ulcer in the " past.  Treatments provided: iv fluids    Family Comments:wife here    OBS brochure/video discussed/provided to patient: N/A    ED Medications:   Medications   lidocaine 1 % 1 mL (not administered)   lidocaine (LMX4) cream (not administered)   sodium chloride (PF) 0.9% PF flush 3 mL (not administered)   sodium chloride (PF) 0.9% PF flush 3 mL (not administered)       Drips infusing?:  No      ED NURSE PHONE NUMBER:   [phone]

## 2017-01-06 NOTE — PROGRESS NOTES
[facility]  Patient is currently open to home care services with [facility].  The patient is currently receiving RN/PT/OT/HA services.  [facility]  and team have been notified that patient is under OBSERVATION STATUS. [facility] liaison will continue to follow patient during stay.  If patient is admitted to inpatient status please provide orders to resume home care at time of discharge if appropriate.

## 2017-01-06 NOTE — PHARMACY-ADMISSION MEDICATION HISTORY
Admission medication history interview status for the 1/6/2017  admission is complete. See EPIC admission navigator for prior to admission medications     Medication history source reliability:Good    Actions taken by pharmacist (provider contacted, etc): spoke with wife who takes care of his meds.      Additional medication history information not noted on PTA med list :None    Medication reconciliation/reorder completed by provider prior to medication history? No    Time spent in this activity: 20 minutes    Prior to Admission medications    Medication Sig Last Dose Taking? Auth Provider   Rosuvastatin Calcium (CRESTOR PO) Take 5 mg by mouth Mon, Wed, Fri evening 1/4/2017 Yes Unknown, Entered By History   albuterol (2.5 MG/3ML) 0.083% neb solution Take 1 vial (2.5 mg) by nebulization every 2 hours as needed for wheezing or shortness of breath / dyspnea prn Yes Basilio Mccormick MD   ipratropium - albuterol 0.5 mg/2.5 mg/3 mL (DUONEB) 0.5-2.5 (3) MG/3ML neb solution Take 1 vial (3 mLs) by nebulization 4 times daily 1/6/2017 at x1 Yes Basilio Mccormick MD   clonazePAM (KLONOPIN) 0.5 MG tablet Take 0.5 tablets (0.25 mg) by mouth 2 times daily as needed for anxiety 1/6/2017 at x1 Yes Santiago Mccall DO   metoprolol (LOPRESSOR) 25 MG tablet Take 0.5 tablets (12.5 mg) by mouth 2 times daily 1/6/2017 at x1 Yes Claus Walker MD   HYDROcodone-acetaminophen (NORCO) 7.5-325 MG per tablet Take 0.5 tablets by mouth 3 times daily as needed for moderate to severe pain Take 0.5 tablets by mouth 3 times daily as needed for moderate to severe pain 1/6/2017 at Unknown time Yes Jg Salinas MD   gabapentin (NEURONTIN) 100 MG capsule Take 1 capsule (100 mg) by mouth 3 times daily 1/6/2017 at x1 Yes Konstantin Rabago MD   hydrALAZINE (APRESOLINE) 25 MG tablet Take 25 mg by mouth 2 times daily  1/6/2017 at x1 Yes Vale Agustin APRN CNP   calcium 600 MG tablet Take 1 tablet by mouth 2 times daily 1/6/2017 at x1 Yes  Reported, Patient   apixaban ANTICOAGULANT (ELIQUIS) 2.5 MG tablet Take 1 tablet (2.5 mg) by mouth 2 times daily 1/6/2017 at x1 Yes Vale Agustin APRN CNP   tamsulosin (FLOMAX) 0.4 MG 24 hr capsule Take 1 capsule (0.4 mg) by mouth daily 1/6/2017 at Unknown time Yes Tani Wang MD   isosorbide mononitrate (IMDUR) 30 MG 24 hr tablet Take 1 tablet (30 mg) by mouth daily 1/6/2017 at Unknown time Yes Vale Agustin APRN CNP   pantoprazole (PROTONIX) 40 MG enteric coated tablet Take 1 tablet (40 mg) by mouth daily Take 30-60 minutes before a meal. 1/6/2017 at Unknown time Yes Konstantin Rabago MD   amLODIPine (NORVASC) 5 MG tablet Take 1 tablet (5 mg) by mouth daily 1/6/2017 at Unknown time Yes Vale Agustin APRN CNP   aspirin 81 MG chewable tablet Take 81 mg by mouth every evening  1/5/2017 at lunch Yes Unknown, Entered By History   guaiFENesin (ROBITUSSIN) 100 MG/5ML SYRP Take 5 mLs by mouth every 6 hours as needed for cough  prn Yes Unknown, Entered By History   ACETAMINOPHEN PO Take 1,000 mg by mouth 3 times daily as needed for pain prn Yes Unknown, Entered By History   VITAMIN D, CHOLECALCIFEROL, PO Take 2,000 Units by mouth daily  1/6/2017 at Unknown time Yes Unknown, Entered By History   ferrous sulfate 325 (65 FE) MG tablet Take 325 mg by mouth daily 1/6/2017 at Unknown time Yes Unknown, Entered By History   Loperamide HCl (IMODIUM OR) Take 1-2 tablets by mouth daily as needed (for loose stools)  prn Yes Reported, Patient   nitroGLYCERIN (NITROSTAT) 0.4 MG SL tablet Place 1 tablet under the tongue every 5 minutes as needed for chest pain. prn Yes Joseph Charles MD   order for DME Equipment being ordered: Nebulizer   Basilio Mccormick MD Tiffany M. Reinitz, PharmD

## 2017-01-06 NOTE — TELEPHONE ENCOUNTER
Patient was scheduled to see provider for a hospital follow up.  Wife stated that patient had low hemoglobin while in hospital.      Routing to provider. Please advise.  Caitie Reyes RN

## 2017-01-07 NOTE — PROGRESS NOTES
List all goals to be met before discharge home     No noted blood in stool or emesis: Met  Weakness improved (appropriate for d/c home with wife assist vs safe discharge plan): Not met   hgb >8: Not met. Blood transfusion in process

## 2017-01-07 NOTE — PROGRESS NOTES
List all goals to be met before discharge home     No noted blood in stool or emesis: Met  Weakness improved (appropriate for d/c home with wife assist vs safe discharge plan): Not met   hgb >8: Not met. Labs taken in AM

## 2017-01-07 NOTE — PROGRESS NOTES
TOLU REAL: Met with pt, pt.'s spouse and pt.'s dtr to discuss d/c planning. Per pt.'s wife pt has done rehab several times at [facility] in [place] which is near to pt.'s home and that has gone well, but pt.'s wife stated that she would prefer to have a [facility] team round on him while he is in rehab so she gave the additional choices of [facility] and [facility] in [place]. Calls out to facilities regarding availability.  P:  will continue to follow to assist with d/c to TCU.    TOLU REAL: [facility] contacted this writer back and they are not able to accept pt at this time per the RN fielding admission calls. This writer also talked to staff at [facility] and they are not able to accept pt due to a shortage of RN's so they currently have a freeze on admissions.    This writer contacted [facility] to see if they had beds available and they do not for today or tomorrow. A msg was left at [facility] in [place] inquiring about bed availability.

## 2017-01-07 NOTE — PROGRESS NOTES
List all goals to be met before discharge home     No noted blood in stool or emesis: Met thus far  Weakness improved (appropriate for d/c home with wife assist vs safe discharge plan): Not met   hgb >8: Not met

## 2017-01-07 NOTE — PLAN OF CARE
Problem: Goal Outcome Summary  Goal: Goal Outcome Summary  Outcome: Improving  Pt is A+O x 4. [place], used HAs. BP is soft, asymptomatic, other VSS on 2L O2 via NC. Reported headache and LLE ache, received Acetaminophen and Oxycodone with relief. Completed 1st unit PRBCs, no reaction. 2nd unit PRBCs is infusing. . RLE amputated, w/c bound at baseline. Independently repo self in bed, boosted with 2 assist. Voiding, still needs UA. SW consult ordered.

## 2017-01-07 NOTE — PROGRESS NOTES
Hospitalist x-cover    Paged for straight cath for urinary retention.  Ordered.    ARAM Jackson MD

## 2017-01-07 NOTE — PROGRESS NOTES
List all goals to be met before discharge home       1) No noted blood in stool or emesis: Met    2) Weakness improved (appropriate for d/c home with wife assist vs safe discharge plan): Not met     3) Hgb >8: Met- 9.9 this am.

## 2017-01-07 NOTE — PROGRESS NOTES
Wife reported to this RN that oxycodone makes the patient loopy and that's why he takes hydrocodone at home. Made PA aware. Was given last dose at 0530 this am, may be the cause of disorientation this am.

## 2017-01-07 NOTE — PLAN OF CARE
Problem: Goal Outcome Summary  Goal: Goal Outcome Summary  PT: Order received, chart reviewed, eval completed, treatment initiated. Pt admitted 1/6/17 under observation status for increased generalized weakness for the past three days. Was recently discharged from [facility] following multiple stays 2/2 pneumonia (12/23/16-12/31/16), (12/11/16-12/20/16) and (11/26/16-11/29/16). Prior to admission pt lives at home with his wife in a wheel chair accessible home, was previously IND with bed<>chair transfers via stand pivot. 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 Pt currently completes supine <>sit with CGA and moderate use of bed rail, completes bed<>chair transfer with Lizeth/CGAx1, daughter present and assisting in set-up. Pt and daughter report the transfer currently takes much longer than usual (pt usually able to complete instinctively). At this time pt is limited by decreased strength, activity tolerance, and balance, would benefit from ongoing skilled PT intervention. PT recommendation: TCU at discharge, pt and daughter aware and in agreement.

## 2017-01-07 NOTE — PLAN OF CARE
Problem: Goal Outcome Summary  Goal: Goal Outcome Summary  Outcome: No Change  VSS on 2L O2. W/c bound at baseline. C/O LLE pain-PRN oxy and tylenol given. Pulses present in LE with doppler. 2 units of blood transfused-recheck labs this AM. Hx DM-BS before meals. RLE ambulated. Pt unable to void-orders placed for straight cath with 550 out. UA sent down to lab. SW consult this AM.

## 2017-01-07 NOTE — PROGRESS NOTES
List all goals to be met before discharge home     No noted blood in stool or emesis: Met thus far  Weakness improved (appropriate for d/c home with wife assist vs safe discharge plan): Not met   hgb >8: Not met. Blood transfusion in process

## 2017-01-07 NOTE — PROGRESS NOTES
01/07/17 1430   Quick Adds   Type of Visit Initial PT Evaluation   Living Environment   Lives With spouse   Living Arrangements house   Home Accessibility bed and bath on same level;grab bars present (toilet)   Number of Stairs to Enter Home 0  (ramp present)   Number of Stairs Within Home 0   Transportation Available family or friend will provide  (pt does not drive)   Living Environment Comment Pt lives in a single story home with his wife, house is wheel chair accessible, pt is wheel chair bound at baseline. Pt with recent continuous supplemental O2 needs at home.   Self-Care   Dominant Hand right   Usual Activity Tolerance moderate   Current Activity Tolerance fair   Equipment Currently Used at Home wheelchair;power chair   Activity/Exercise/Self-Care Comment Pt not currently receiving therapies, but has many times participated in PT/OT since 11/2016   Functional Level Prior   Ambulation 4-->completely dependent  (wheel chair, pt able to progress without assist)   Transferring 1-->assistive equipment   Toileting 1-->assistive equipment   Bathing 2-->assistive person   Dressing 2-->assistive person   Eating 0-->independent   Communication 0-->understands/communicates without difficulty   Swallowing 0-->swallows foods/liquids without difficulty   Cognition 0 - no cognition issues reported   Fall history within last six months no   Which of the above functional risks had a recent onset or change? transferring   General Information   Onset of Illness/Injury or Date of Surgery - Date 01/06/17   Referring Physician Darwin Downs PA-C   Patient/Family Goals Statement pt and family agreeable to TCU recommendation prior to PT eval   Pertinent History of Current Problem (include personal factors and/or comorbidities that impact the POC) Pt admitted 1/6/17 under observation status for increased generalized weakness for the past three days. Was recently discharged from [facility] following multiple stays 2/2 pneumonia  (12/23/16-12/31/16), (12/11/16-12/20/16) and (11/26/16-11/29/16).    Precautions/Limitations fall precautions;oxygen therapy device and L/min  (2L o2)   Weight-Bearing Status - RLE (AKA)   General Observations Pt is pleasant and cooperative, daughter present throughout session   General Info Comments activity: amb with assist   Cognitive Status Examination   Orientation orientation to person, place and time   Level of Consciousness alert   Follows Commands and Answers Questions 100% of the time;able to follow multistep instructions   Personal Safety and Judgment intact   Memory intact   Pain Assessment   Patient Currently in Pain No   Integumentary/Edema   Integumentary/Edema no deficits were identifed   Posture    Posture Forward head position;Protracted shoulders   Range of Motion (ROM)   ROM Comment R hip WFL, L LE WFL against gravity   Strength   Strength Comments L LE 3+/5 at hip, knee and ankle   Bed Mobility   Bed Mobility Comments supine <>sit with CGA and moderate use of bed rail   Transfer Skills   Transfer Comments MinAx1 for bed<>chair transfer (stand pivot)   Gait   Gait Comments pt is nonambulatory at baseline; does not use a prosthesis   Balance   Balance Comments fair seated balance, poor standing balance requires B UE support and [place]   Sensory Examination   Sensory Perception no deficits were identified   Sensory Perception Comments per pt report   General Therapy Interventions   Planned Therapy Interventions balance training;bed mobility training;strengthening;transfer training;wheelchair management/propulsion training;progressive activity/exercise   Clinical Impression   Criteria for Skilled Therapeutic Intervention yes, treatment indicated   PT Diagnosis impaired functional mobility   Influenced by the following impairments decreased strength, balance, activity tolerance   Functional limitations due to impairments difficulty with bed mobility, transfers   Clinical Presentation  "Stable/Uncomplicated   Clinical Presentation Rationale Weakness and deconditioning in setting of multiple recent hospital stays, anemia (responded to transfusion of PRBCs)   Clinical Decision Making (Complexity) Low complexity   Therapy Frequency` 3 times/week   Predicted Duration of Therapy Intervention (days/wks) 3 days   Anticipated Discharge Disposition Transitional Care Facility   Risk & Benefits of therapy have been explained Yes   Patient, Family & other staff in agreement with plan of care Yes   Clinical Impression Comments [facility] OBS Auth email sent for auth 1/7/17-1/9/17   [facility] [employer] \"6 Clicks\"   2016, Trustees of [facility], under license to [employer].  All rights reserved.   6 Clicks Short Forms Basic Mobility Inpatient Short Form   [facility] AM-PAC  \"6 Clicks\" V.2 Basic Mobility Inpatient Short Form   1. Turning from your back to your side while in a flat bed without using bedrails? 3 - A Little   2. Moving from lying on your back to sitting on the side of a flat bed without using bedrails? 3 - A Little   3. Moving to and from a bed to a chair (including a wheelchair)? 3 - A Little   4. Standing up from a chair using your arms (e.g., wheelchair, or bedside chair)? 3 - A Little   5. To walk in hospital room? 1 - Total   6. Climbing 3-5 steps with a railing? 1 - Total   Basic Mobility Raw Score (Score out of 24.Lower scores equate to lower levels of function) 14   Total Evaluation Time   Total Evaluation Time (Minutes) 10     "
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
## 2017-01-07 NOTE — PROGRESS NOTES
"[facility]    Hospitalist Progress Note    Date of Service (when I saw the patient): 01/07/2017    Assessment and Plan  Jossue Mendes is a 81 year old male who presents with weakness which appears to have worsened over the past 3 days in the setting of multiple recent hospitalizations including recent hospitalization for approximately one week and discharged 12/31/16 as well as complaints of \"total body ache.\"    Weakness and deconditioning: anticipate multifactorial. Patient is nonambulatory at baseline, though transfers from bed to wheelchair with his history of right AKA. Patient has a prosthesis, though does not use this. With multiple recent hospitalizations including one week hospitalization with discharge 12/31/16, anticipate some degree of deconditioning related to this. Home Occupational therapy assessed patient 1/3/16, recommendation was for 2 additional visits this month.   Received blood transfusion x2 overnight.  This morning, nursing staff had to utilize lift to get patient out of bed 2/2 ongoing weakness.  Wife presented and feels this may be due to receiving oxycodone in lieu of PTA norco.  Wife notes he takes 1/2 tab of the 7.5/325 pill TID PRN and anytime there is a stronger agent ordered, he gets \"loopy and very weak.\"  - Will ask PT to evaluate today given significant difficulty getting out of bed  - Social work consulted and following; I discussed with wife and daughter the possibility of requiring TCU at discharge to regain strength; both are in agreement and feel he would benefit as he was not doing well at home  - Discontinue oxycodone; as we are unable to provide 1/2 tab of norco per home list (due to availability/pharmacy) will write for lortab elixir 7.5mL TID PRN, which is equivalent to pt's home dose    Anemia: Suspect multifactorial in the setting of chronic kidney disease, chronic anticoagulation, anemia of chronic disease with recent multiple hospitalizations for " right sided pneumonia. Patient is on both Chronic anticoagulation as well as aspirin given history of coronary artery disease and atrial fibrillation. Per discussion of wife, primary cardiologist, Dr. Walker, is aware of this. Negative occult blood in ED. Patient has had a history of erosive gastritis on EGD in , though resolved on repeat .  Hgb 7.0--9.9 after 2u PRBCs.  - Continue Protonix 40 mg daily  - Continuing apixaban as well as 81 mg aspirin; low threshold to discontinue aspirin in the setting of chronic anticoagulation.   - Continue prior to admission iron supplementation; note iron saturation of 11% on admission  - Discussed with patient and wife regarding goals of care. Patient has a POLST on file describing a limited care plan focused on comfort and remaining at home.   - Recommend follow-up visit with primary provider, Dr. Rabago, in approximately 1 week with recheck of CBC and discussion at that time regarding outpatient evaluation through gastroenterology. If outpatient evaluation is pursued, consider discontinuation of Eliquis approximately 5 days prior to endoscopy. Patient and wife are in agreement with this plan, they themselves are uncertain if they would want to pursue endoscopy.    Type 2 diabetes with diabetic retinopathy, nephropathy: most recent hemoglobin A1c in 2016 of 7.0, on repeat today 6.9.  Typically diet controlled.  -medium dose sliding scale insulin available    Atrial fibrillation: stable, rate controlled.  Continue eliquis 2.5 mg b.i.d. And metoprolol 12.5 g b.i.d.     Coronary artery disease status post historical coronary artery bypass graftin LIMA to LAD, SVG to PDA and OM 2 with associated historical ischemic cardiomyopathy. Does not appear to be in heart failure currently.  - Continuing chronic anticoagulation as above  - Continues on PTA Hydralazine 25 mg b.i.d., Imdur 30 mg daily, Metoprolol 12.5 mg b.i.d., Rosuvastatin 5 mg daily, Amlodipine 5 mg daily,  "Aspirin 81 mg daily    Urinary retention with BPH: patient with significant urinary retention two admissions ago with García catheter placed 12/17/16. Followed up with urology as an outpatient with removal of García catheter 1/2/16.  UA without evidence of infection.  Patient overnight had persistent urinary retention requiring straight catherization x1 for 550cc urine at 0700.  - Repeat bladder scan at 1300, if >300cc urine will place garcía catheter and have follow-up with Urology  - Intake and output  - Continue prior to admission Flomax    DVT Prophylaxis: Warfarin  Code Status: DNR/DNI    Disposition: Expected discharge in 1-2 days once safe disposition established.    Darwin Downs PA-C    Interval History  Pt lying in bed, endorsing ongoing weakness.  Not enthused of the idea of a rehabilitation center however states \"whatever my wife thinks.\"  Denies cp, sob, nausea, vomiting.  No dark stools or BRB per rectum.    -Data reviewed today: I reviewed all new labs and imaging results over the last 24 hours. I personally reviewed no images or EKG's today.    Physical Exam  Temp: 97.9  F (36.6  C) Temp src: Oral BP: 125/68 mmHg   Heart Rate: 80 Resp: 16 SpO2: 94 % O2 Device: Nasal cannula Oxygen Delivery: 1 LPM  Filed Vitals:    01/06/17 1527   Weight: 62.2 kg (137 lb 2 oz)     Vital Signs with Ranges  Temp:  [96.2  F (35.7  C)-98.7  F (37.1  C)] 97.9  F (36.6  C)  Heart Rate:  [68-84] 80  Resp:  [16-20] 16  BP: ()/(46-81) 125/68 mmHg  SpO2:  [91 %-97 %] 94 %  I/O last 3 completed shifts:  In: 240 [P.O.:240]  Out: 400 [Urine:400]    GEN: well-developed, thin male, appears comfortable  PULM: lungs CTA bilaterally, no increased work of breathing, no wheeze, crackles, rhonchi  CV: irregularly irregular, S1/S2  GI: soft, nontender, nondistended, +BS in all 4 quadrants  SKIN: pale, warm & dry without rash or wound, no pedal edema    Medications       sodium chloride (PF)  3 mL Intracatheter Q8H     amLODIPine  5 mg " Oral Daily     apixaban ANTICOAGULANT  2.5 mg Oral BID     aspirin  81 mg Oral QPM     ferrous sulfate  325 mg Oral Daily     gabapentin  100 mg Oral TID     hydrALAZINE  25 mg Oral BID     ipratropium - albuterol 0.5 mg/2.5 mg/3 mL  3 mL Nebulization 4x Daily     isosorbide mononitrate  30 mg Oral Daily     metoprolol  12.5 mg Oral BID     pantoprazole  40 mg Oral Daily     rosuvastatin (CRESTOR) tablet 5 mg  5 mg Oral Once per day on Mon Wed Fri     tamsulosin  0.4 mg Oral Daily     sodium chloride (PF)  3 mL Intracatheter Q8H     insulin aspart  1-7 Units Subcutaneous TID AC     insulin aspart  1-5 Units Subcutaneous At Bedtime       Data    Recent Labs  Lab 01/07/17  0630 01/06/17  1200   WBC 9.2 7.2   HGB 9.9* 7.0*   MCV 90 89    242   INR  --  1.51*   NA  --  145*   POTASSIUM  --  4.2   CHLORIDE  --  109   CO2  --  27   BUN  --  26   CR  --  2.16*   ANIONGAP  --  9   AURORA  --  7.9*   GLC  --  144*   TROPI  --  <0.015The 99th percentile for upper reference range is 0.045 ug/L.  Troponin values in the range of 0.045 - 0.120 ug/L may be associated with risks of adverse clinical events.       Recent Results (from the past 24 hour(s))   Chest XR,  PA & LAT    Narrative    XR CHEST 2 VW  1/6/2017 1:23 PM     HISTORY:  Weakness.      Impression    IMPRESSION: No significant change since 12/23/2016. Extensive  pulmonary infiltration on the right which has not changed appreciably  since 12/23/2016. Pleural thickening and/or fluid on the right.  Previous sternotomy. Mild cardiomegaly. Left lung is clear.    ALONSO GUILLEN MD

## 2017-01-08 NOTE — PROGRESS NOTES
List all goals to be met before discharge home       1) No noted blood in stool or emesis: Met    2) Weakness improved (appropriate for d/c home with wife assist vs safe discharge plan): Not met     3) Hgb >8: Met

## 2017-01-08 NOTE — PLAN OF CARE
Problem: Goal Outcome Summary  Goal: Goal Outcome Summary  Outcome: No Change  A&Ox1-2. VSS on 1-2L O2. W/C bound at baseline. Charles placed yesterday evening d/t retention and patent. Pt refused to eat supper last night. BS was 106 HS. Contact precautions. SW following for placement.

## 2017-01-08 NOTE — PROGRESS NOTES
List all goals to be met before discharge home        1) No noted blood in stool or emesis: Met    2) Weakness improved (appropriate for d/c home with wife assist vs safe discharge plan): partially  met. SW working on putting out referrals.  Waiting to call back from facitlities.     3) Hgb >8: Met- last Hgb 9.9

## 2017-01-08 NOTE — PLAN OF CARE
Problem: Goal Outcome Summary  Goal: Goal Outcome Summary  PT: Pt remains appropriate for TCU at discharge, no further acute IP PT need, will defer further therapy to TCU. Discussed with pt and wife briefly this AM, demonstrated understanding and agreement. Recommend continued transfers to w/c as appropriate with nursing with use of lift at needed.     Physical Therapy Discharge Summary    Reason for therapy discharge:    No further IP PT needs, appropriate for PT at next level of care    Progress towards therapy goal(s). See goals on Care Plan in [facility] electronic health record for goal details.  Pt seen for eval only    Therapy recommendation(s):    Continued therapy is recommended.  Rationale/Recommendations:  Progressing strengthening and activity tolerance for increased IND with functional mobility.

## 2017-01-08 NOTE — PLAN OF CARE
"Problem: Goal Outcome Summary  Goal: Goal Outcome Summary  Outcome: Improving  A&Ox1-2 and \"loopy\" per wife, improved throughout day. PA aware. W/c bound at baseline but unable to pivot to chair this am, utilized lift for activity. PT eval complete, recommending TCU. Tolerating renal diet. , 127, 127. VSS on 1L NC (baseline). Hgb 9.9, no s/sx of bleeding. Unable to void, placed garcía per order. C/o 10/10 stump pain, given tylenol per wife request.          "

## 2017-01-08 NOTE — PLAN OF CARE
Problem: Goal Outcome Summary  Goal: Goal Outcome Summary  Outcome: No Change  A&Ox2; disoriented to time and situation. On 2L NC otherwise VSS. Pain managed with Loratab. Charles patent. Lift to wheel chair. Awaiting placement. RN will continue to monitor.

## 2017-01-08 NOTE — PROGRESS NOTES
TOLU  I: Fax[facility] referral [facility], and [facility].    P: TOLU to follow.

## 2017-01-08 NOTE — PROGRESS NOTES
List all goals to be met before discharge home       1) No noted blood in stool or emesis: Met    2) Weakness improved (appropriate for d/c home with wife assist vs safe discharge plan): Not met     3) Hgb >8: Met- last Hgb 9.9

## 2017-01-08 NOTE — PROGRESS NOTES
SW  D: This writer received a call from Sue at [facility] and they are able to accept pt tomorrow and he would be able to leave [facility] as early as 1000 if medically stable. Per pt he would like to have a w/c ride arranged through [facility] and is aware this is private pay, pt has his own w/c in the hospital room. Pt and family are agreeable to the d/c plan to [facility] [place].  P: Pt plans to d/c to [facility] in [place] on 1/9.    Addendum  This writer called to set up a ride for tomorrow and the earliest [facility] has is an 1130 so this was arranged for pt.

## 2017-01-08 NOTE — PROGRESS NOTES
Call received from JOSE Diaz.  Charles catheter will be removed and staffing will complete a voiding trial as ordered by Dr. Reyna.   Will remove when patient if finished eating supper.

## 2017-01-09 NOTE — PROGRESS NOTES
[facility]  Patient is currently open to home care services with [facility].  The patient is currently receiving RN/PT/OT/HA      services.  [facility]  and team have been notified that patient is under OBSERVATION STATUS. [facility] liaison will continue to follow patient during stay.  If patient is admitted to inpatient status please provide orders to resume home care at time of discharge if appropriate.  Meagan Grant RN, BSN  [facility] Liaison  [phone]

## 2017-01-09 NOTE — PROGRESS NOTES
List all goals to be met before discharge home        1) No noted blood in stool or emesis: Met    2) Weakness improved (appropriate for d/c home with wife assist vs safe discharge plan):  Met. Patient accepted at the [facility] in [place].  Ride is set-up for 1000 this morning      3) Hgb >8: Met- last Hgb 9.9

## 2017-01-09 NOTE — PROGRESS NOTES
List all goals to be met before discharge home        1) No noted blood in stool or emesis: Met    2) Weakness improved (appropriate for d/c home with wife assist vs safe discharge plan):  Met. Patient accepted at the [facility] in [place].  Ride is set-up for 1000 tomorrow.      3) Hgb >8: Met- last Hgb 9.9

## 2017-01-09 NOTE — PLAN OF CARE
Problem: Goal Outcome Summary  Goal: Goal Outcome Summary  Outcome: No Change  A&Ox2. VSS on 2 L NC. Uses w/c at baseline. Up with A2 and GB to commode or w/c or use of lift. Denies pain. Hx DM-BS b4 meals. BS -no insulin given. Charles taken out after supper for void trial. Requested for pt to try to void tonight-pt refused and was bladder scanned for 175. Requested for pt to try to void again at 0600 and refused again. Bladder scanned at 417 and straight cathed for 475. Plan to d/c this AM at 10.

## 2017-01-09 NOTE — PLAN OF CARE
Problem: Goal Outcome Summary  Goal: Goal Outcome Summary  Outcome: Improving  RN: Patient A/O x2.  B/P soft.  Afeb.  Remain on 2 liters NC.  Pain meds as needed for pain.  None given the past 4 hours.  Tolerating diet--needs tray set-up . Blood sugars stable.  Up with lift or a strong 2/gait belt with a pivot turn to wheelchair. Charles removed.  Patient now due to void.  Skin intact.  Patient set-up to discharge tomorrow at 1000 to [facility].

## 2017-01-09 NOTE — TELEPHONE ENCOUNTER
Message from scheduling that spouse canceled holter appointment for today. Per chart, patient was admitted for weakness and is being discharged to [facility] with a plan to have a holter placed before he leaves. Left a message for patient's spouse with an update.

## 2017-01-09 NOTE — PROGRESS NOTES
Tiffany and spoke with JOSE Davis   regarding placing garcía vs. Straight cath prn orders.  PA also informed narcotic scripts need to be signed prior to d/c.

## 2017-01-09 NOTE — PROGRESS NOTES
List all goals to be met before discharge home        1) No noted blood in stool or emesis: Met    2) Weakness improved (appropriate for d/c home with wife assist vs safe discharge plan):  Met. Patient accepted at the [facility] in [place].  Ride is set-up for 1000 tomorrow.      3) Hgb >8: Met- last Hgb 9.9

## 2017-01-09 NOTE — PLAN OF CARE
List all goals to be met before discharge home        1) No noted blood in stool or emesis: Met    2) Weakness improved (appropriate for d/c home with wife assist vs safe discharge plan):  Met. Patient accepted at the [facility] in [place].  Ride is set-up for 1130 this morning      3) Hgb >8: Met- last Hgb 9.3    Forgetful. Needs assistance with turning/repositioning. Blood sugar- 104. Tolerating regular diet..Pain managed  With Lortab and tylenol. Attempted to get up to wheelchair with strong assist x2 and gait belt, but pt too weak. Up to wheelchair with lift.  93-97% on 1L O2. Used 2L O2 overnight. Plan to discharge to [facility] today. Will f/u outpatient with urology.

## 2017-01-09 NOTE — DISCHARGE SUMMARY
Patient has been discharged to TCU by [facility] wheelchair transportation January 9, 2017 11:40 AM.  Home medication regimen discussed with patient and wife, verbalized understanding. Diet and activity  discussed with Holter monitor set up before discharge. Upcoming appointments also discussed. Patient and wife had no questions.

## 2017-01-09 NOTE — DISCHARGE SUMMARY
"[facility]    Discharge Summary  Hospitalist    Date of Admission:  1/6/2017  Date of Discharge:  1/9/2017  Discharging Provider: Ping Hunt PA-C  Date of Service (when I saw the patient): 01/09/2017    Discharge Diagnoses  Weakness and deconditioning   Anemia    History of Present Illness  Jossue Mendes is a 81 year old male who presented 1/6 with weakness which had worsened over the past 3 days in the setting of multiple recent hospitalizations including recent hospitalization for approximately one week and discharged 12/31/16 as well as complaints of \"total body ache.\" See H&P by Dr. Juan Quan from 1/6/17 for full details.     Patient was ultimately found to have a hemoglobin of 7.0 on admission, pt was transfused with 2 U PRBCs, hemoglobin on day of discharge was 9.3. Evaluated by PT who recommended TCU. Pt discharged to [facility]. Voiding trail performed as pt had Charles catheter in place for urinary retention during hospitalization, orders for ongoing bladder scans and straight caths PRN placed at discharge. Pt denies CP, SOB, abdominal pain, fevers, chills, nausea, or vomiting on day of discharge. Utilizing baseline 1 L supplemental oxygen via NC. No acute events overnight.     Hospital Course  Jossue Mendes was admitted on 1/6/2017.  The following problems were addressed during his hospitalization:    Weakness and deconditioning: anticipate multifactorial. Patient is nonambulatory at baseline, though transfers from bed to wheelchair with his history of right AKA. Patient has a prosthesis, though does not use this. With multiple recent hospitalizations including one week hospitalization with discharge 12/31/16, anticipate some degree of deconditioning related to this. Home Occupational therapy assessed patient 1/3/16, recommendation was for 2 additional visits this month.   Received blood transfusion x2 with improvement in hgb, as discussed below.    - PT " evaluated, recommending TCU; plan to discharge to [facility]; TOLU facilitated setting this up throughout hospitalization   - Continue PTA lortab at discharge  - Follow-up with PCP in the next to discuss hospitalization      Anemia: Suspect multifactorial in the setting of chronic kidney disease, chronic anticoagulation, anemia of chronic disease with recent multiple hospitalizations for right sided pneumonia. Patient is on both Chronic anticoagulation as well as aspirin given history of coronary artery disease and atrial fibrillation. Per discussion of wife, primary cardiologist, Dr. Walker, is aware of this. Negative occult blood in ED. Patient has had a history of erosive gastritis on EGD in 2011, though resolved on repeat 2015.  Hgb 7.0--9.9 after 2u PRBCs. 9.3 on day of discharge with no active signs of bleeding reported by nursing staff.  - Continue Protonix 40 mg daily  - Continuing apixaban as well as 81 mg aspirin; low threshold to discontinue aspirin in the setting of chronic anticoagulation.   - Continue prior to admission iron supplementation; note iron saturation of 11% on admission  - Recommend follow-up visit with primary provider, Dr. Rabago, in approximately 1 week with recheck of CBC and discussion at that time regarding outpatient evaluation through gastroenterology. If outpatient evaluation is pursued, consider discontinuation of Eliquis approximately 5 days prior to endoscopy. Patient and wife are in agreement with this plan, they themselves are uncertain if they would want to pursue endoscopy.  - Recommend consideration of EPO, but will defer this to Nephrology after discharge     Type 2 diabetes with diabetic retinopathy, nephropathy: most recent hemoglobin A1c in October 2016 of 7.0, on repeat today 6.9.  Typically diet controlled.  - Defer further management to PCP     Atrial fibrillation: stable, rate controlled.  Continue eliquis 2.5 mg b.i.d. And metoprolol 12.5 g b.i.d.   - Holter placed  prior to discharge as ordered by Dr. Walker; results followed up with Dr. Walker    Coronary artery disease status post historical coronary artery bypass graftin LIMA to LAD, SVG to PDA and OM 2 with associated historical ischemic cardiomyopathy. Does not appear to be in heart failure currently.  - Continuing chronic anticoagulation as above  - Continues on PTA Hydralazine 25 mg b.i.d., Imdur 30 mg daily, Metoprolol 12.5 mg b.i.d., Rosuvastatin 5 mg daily, Amlodipine 5 mg daily, Aspirin 81 mg daily    Urinary retention with BPH: patient with significant urinary retention two admissions ago with García catheter placed 16. Followed up with urology as an outpatient with removal of García catheter 16.  UA without evidence of infection.  Patient overnight day #2 had persistent urinary retention requiring straight catherization x1 for 550cc urine at 0700, with ongoing retention throughout day requiring garcía catheter placement. García removed evening of .  - Continue with bladder scans and PRN SIC - can continue at TCU   - Continue prior to admission Flomax  - Follow up with Urology within one week; Care Coordinator facilitating establishing follow-up    Ping Hunt PA-C    This patient was discussed with Dr. Reyna of the Hospitalist Service who agrees with current plans as outlined above.     Significant Results and Procedures  See below     Pending Results  These results will be followed up by PCP    Unresulted Labs Ordered in the Past 30 Days of this Admission     Date and Time Order Name Status Description    2016 1018 Viral culture In process     2016 1018 Fungus Culture, non-blood Preliminary     2016 1018 Fungus Culture, non-blood Preliminary     2016 1018 Fungus Culture, non-blood Preliminary     2016 1018 AFB Culture Non Blood Preliminary     2016 1016 AFB Culture Non Blood Preliminary         Code Status  DNR / DNI       Primary Care Physician  Konstantin RAMOS  MD H[facility]    Physical Exam  Temp: 97.2  F (36.2  C) Temp src: Oral BP: 151/65 mmHg   Heart Rate: 84 Resp: 16 SpO2: 96 % O2 Device: Nasal cannula Oxygen Delivery: 2 LPM  Filed Vitals:    01/06/17 1527 01/08/17 0700   Weight: 62.2 kg (137 lb 2 oz) 62.5 kg (137 lb 12.6 oz)     Vital Signs with Ranges  Temp:  [97.2  F (36.2  C)-98.2  F (36.8  C)] 97.2  F (36.2  C)  Heart Rate:  [81-87] 84  Resp:  [16] 16  BP: (105-151)/(59-72) 151/65 mmHg  SpO2:  [95 %-97 %] 96 %  I/O last 3 completed shifts:  In: 180 [P.O.:180]  Out: 1175 [Urine:1175]    CONSTITUTIONAL: Pt laying in bed, dressed in hospital garb. Appears comfortable. Cooperative with interview. Accompanied by wife at bedside.  HEENT: Normocephalic, atraumatic. Negative for conjunctival redness or scleral icterus.    CARDIOVASCULAR: RRR, no murmurs, rubs, or extra heart sounds appreciated. Pulses +2/4 and regular in upper and lower extremities, bilaterally.   RESPIRATORY: No increased work of breathing.  CTA, bilat; no wheezes, rales, or rhonchi appreciated.  GASTROINTESTINAL:  Abdomen soft, non-distended. BS auscultated in all four quadrants. Negative for tenderness to palpation.  No masses or organomegaly noted.  MUSCULOSKELETAL:  Right AKA noted   HEMATOLOGIC/LYMPHATIC/IMMUNOLOGIC: Negative for lower extremity edema, bilaterally.  SKIN: Warm, dry, intact. No jaundice noted. Negative for suspicious lesions, rashes, bruising, open sores or abrasions.     Discharge Disposition  Discharged to TCU  Condition at discharge: Stable    Consultations This Hospital Stay  SOCIAL WORK IP CONSULT  PHYSICAL THERAPY ADULT IP CONSULT  PHYSICAL THERAPY ADULT IP CONSULT  OCCUPATIONAL THERAPY ADULT IP CONSULT    Time Spent on This Encounter  Ping SAEZ, personally saw the patient today and spent less than or equal to 30 minutes discharging this patient.    Discharge Orders    General info for SNF   Length of Stay Estimate: Short Term Care: Estimated # of Days  <30  Condition at Discharge: Stable  Level of care:skilled   Rehabilitation Potential: Good  Admission H&P remains valid and up-to-date: Yes  Recent Chemotherapy: N/A  Use Nursing Home Standing Orders: Yes     Mantoux instructions   Give two-step Mantoux (PPD) Per Facility Policy Yes     Reason for your hospital stay   You were admitted due to worsening weakness at home.  You were found to have a low hemoglobin level and received two units of blood with improvement.  You were also found to be very weak and recommended to discharge to a rehab facility before going home.     Activity - Up with nursing assistance     Follow Up and recommended labs and tests   Follow up with primary care provider in 1-2 weeks.  The following labs/tests are recommended: repeat Hemoglobin.  Follow up with specialist, Urology, in 1-2 weeks.  No follow up labs or test are needed.     Physical Therapy Adult Consult   Evaluate and treat as clinically indicated.    Reason:  Generalized weakness     Occupational Therapy Adult Consult   Evaluate and treat as clinically indicated.    Reason:  Generalized weakness     Oxygen - Nasal cannula   1 Lpm by nasal cannula to keep O2 sats 92% or greater.     Fall precautions     Advance Diet as Tolerated   Follow this diet upon discharge: Orders Placed This Encounter  Combination Diet Regular Diet Adult; Renal Diet       Discharge Medications  Current Discharge Medication List      CONTINUE these medications which have CHANGED    Details   HYDROcodone-acetaminophen (NORCO) 7.5-325 MG per tablet Take 0.5 tablets by mouth 3 times daily as needed for moderate to severe pain Take 0.5 tablets by mouth 3 times daily as needed for moderate to severe pain  Qty: 45 tablet, Refills: 0    Associated Diagnoses: Pain      clonazePAM (KLONOPIN) 0.5 MG tablet Take 0.5 tablets (0.25 mg) by mouth 2 times daily as needed for anxiety  Qty: 30 tablet, Refills: 0    Associated Diagnoses: Anxiety         CONTINUE these  medications which have NOT CHANGED    Details   Rosuvastatin Calcium (CRESTOR PO) Take 5 mg by mouth Mon, Wed, Fri evening      albuterol (2.5 MG/3ML) 0.083% neb solution Take 1 vial (2.5 mg) by nebulization every 2 hours as needed for wheezing or shortness of breath / dyspnea  Qty: 360 mL, Refills: 0    Associated Diagnoses: Pneumonia of right lower lobe due to infectious organism      ipratropium - albuterol 0.5 mg/2.5 mg/3 mL (DUONEB) 0.5-2.5 (3) MG/3ML neb solution Take 1 vial (3 mLs) by nebulization 4 times daily  Qty: 360 mL, Refills: 0    Associated Diagnoses: Pneumonia of right lower lobe due to infectious organism      metoprolol (LOPRESSOR) 25 MG tablet Take 0.5 tablets (12.5 mg) by mouth 2 times daily  Qty: 60 tablet, Refills: 1    Associated Diagnoses: Cardiomyopathy (H)      gabapentin (NEURONTIN) 100 MG capsule Take 1 capsule (100 mg) by mouth 3 times daily  Qty: 270 capsule, Refills: 3    Associated Diagnoses: Diabetic polyneuropathy associated with type 2 diabetes mellitus (H)      hydrALAZINE (APRESOLINE) 25 MG tablet Take 25 mg by mouth 2 times daily   Qty: 540 tablet, Refills: 3    Associated Diagnoses: Cardiomyopathy (H); Benign essential hypertension      calcium 600 MG tablet Take 1 tablet by mouth 2 times daily      apixaban ANTICOAGULANT (ELIQUIS) 2.5 MG tablet Take 1 tablet (2.5 mg) by mouth 2 times daily  Qty: 60 tablet, Refills: 11    Associated Diagnoses: Paroxysmal atrial fibrillation (H)      tamsulosin (FLOMAX) 0.4 MG 24 hr capsule Take 1 capsule (0.4 mg) by mouth daily  Qty: 90 capsule, Refills: 3    Associated Diagnoses: Benign prostatic hyperplasia with lower urinary tract symptoms, unspecified morphology      isosorbide mononitrate (IMDUR) 30 MG 24 hr tablet Take 1 tablet (30 mg) by mouth daily  Qty: 90 tablet, Refills: 3    Associated Diagnoses: Cardiomyopathy (H)      pantoprazole (PROTONIX) 40 MG enteric coated tablet Take 1 tablet (40 mg) by mouth daily Take 30-60 minutes  before a meal.  Qty: 90 tablet, Refills: 3    Comments: Alternative for lansoprazole since insurance not covering lansoprazole.  Associated Diagnoses: Gastroesophageal reflux disease without esophagitis      amLODIPine (NORVASC) 5 MG tablet Take 1 tablet (5 mg) by mouth daily  Qty: 90 tablet, Refills: 3    Associated Diagnoses: Hypertension      aspirin 81 MG chewable tablet Take 81 mg by mouth every evening       guaiFENesin (ROBITUSSIN) 100 MG/5ML SYRP Take 5 mLs by mouth every 6 hours as needed for cough       ACETAMINOPHEN PO Take 1,000 mg by mouth 3 times daily as needed for pain      VITAMIN D, CHOLECALCIFEROL, PO Take 2,000 Units by mouth daily       ferrous sulfate 325 (65 FE) MG tablet Take 325 mg by mouth daily      Loperamide HCl (IMODIUM OR) Take 1-2 tablets by mouth daily as needed (for loose stools)       nitroGLYCERIN (NITROSTAT) 0.4 MG SL tablet Place 1 tablet under the tongue every 5 minutes as needed for chest pain.  Qty: 90 tablet, Refills: 0    Associated Diagnoses: CAD (coronary artery disease)      order for DME Equipment being ordered: Nebulizer  Qty: 1 Units, Refills: 0    Associated Diagnoses: Pneumonia of right lower lobe due to infectious organism           Allergies  Allergies   Allergen Reactions     Blood Transfusion Related (Informational Only) Other (See Comments)     Patient has a history of a clinically significant antibody against RBC antigens.  A delay in compatible RBCs may occur.     Remeron Soltab      Hostility, very aggresive     Methotrexate      Narcosis of jaw     Atorvastatin      Legs jump     Bystolic [Nebivolol Hcl]      Cefuroxime Other (See Comments)     Weakness       Clonidine      hostility     Relafen [Nabumetone]      Temazepam      Cant sleep     Tramadol Itching     And dizzy     Clindamycin Rash     Back pain     Indocin Rash     Rash and blisters     Levaquin [Levofloxacin] Rash     Penicillin G Rash     Prilosec [Omeprazole Magnesium] Rash     Data  Most  Recent 3 CBC's:  Recent Labs   Lab Test  01/09/17   0542  01/07/17   0630  01/06/17   1200  12/31/16   0912   WBC   --   9.2  7.2  6.5   HGB  9.3*  9.9*  7.0*  8.0*   MCV   --   90  89  90   PLT   --   238  242  235      Most Recent 3 BMP's:  Recent Labs   Lab Test  01/06/17   1200  12/31/16   0912  12/30/16   0824   NA  145*  145*  145*   POTASSIUM  4.2  3.9  4.3   CHLORIDE  109  110*  113*   CO2  27  28  24   BUN  26  17  17   CR  2.16*  1.74*  1.72*   ANIONGAP  9  7  8   AURORA  7.9*  7.8*  7.9*   GLC  144*  132*  118*     Most Recent 2 LFT's:  Recent Labs   Lab Test  12/23/16   1211  12/11/16   1948   AST  13  27   ALT  7  25   ALKPHOS  58  51   BILITOTAL  0.4  0.3     Most Recent INR's and Anticoagulation Dosing History:  Anticoagulation Dose History     Recent Dosing and Labs Latest Ref Rng 6/8/2014 6/18/2014 4/1/2015 8/19/2015 1/24/2016 3/26/2016 1/6/2017    INR 0.86 - 1.14 1.02 1.00 1 0.87 1.15(H) 1.39(H) 1.51(H)        Most Recent 3 Troponin's:  Recent Labs   Lab Test  01/06/17   1200  04/17/16   0350  04/16/16   1504   TROPI  <0.015  The 99th percentile for upper reference range is 0.045 ug/L.  Troponin values in   the range of 0.045 - 0.120 ug/L may be associated with risks of adverse   clinical events.    0.208*  0.256*     Most Recent Cholesterol Panel:  Recent Labs   Lab Test  12/08/16   0848   CHOL  183   LDL  107*   HDL  41   TRIG  174*     Most Recent 6 Bacteria Isolates From Any Culture (See EPIC Reports for Culture Details):  Recent Labs   Lab Test  12/29/16   1017  12/29/16   1016  12/29/16   1015  12/25/16   0501  12/25/16   0445  12/23/16   1223   CULT  Lisa glabrata isolated*  Light growth Enterococcus species Susceptibility testing done on previous   specimen  Plus Light growth Normal respiratory katerin  *  Candida glabrata isolated*  Culture received and in progress.  Positive AFB results are called as soon as   detected.  Final report to follow in 7 to 8 weeks.  Assayed at [facility]  [address] [address]    Light growth Enterococcus species  Plus Light growth Normal respiratory katerin  *  Candida glabrata isolated*  Culture received and in progress.  Positive AFB results are called as soon as   detected.  Final report to follow in 7 to 8 weeks.  Assayed at [facility] [address]    Light growth Normal respiratory katerin  Canceled, Test credited Duplicate request  Moderate growth Normal katerin  Moderate growth Candida glabrata  *  No growth     Most Recent TSH, T4 and A1c Labs:  Recent Labs   Lab Test  01/07/17   0630   09/23/16   1307   09/27/10   1050   TSH   --    --   1.00   < >  <0.03*   T4   --    --    --    --   0.91   A1C  6.9*   < >   --    < >   --     < > = values in this interval not displayed.     Results for orders placed or performed during the hospital encounter of 01/06/17   Chest XR,  PA & LAT    Narrative    XR CHEST 2 VW  1/6/2017 1:23 PM     HISTORY:  Weakness.      Impression    IMPRESSION: No significant change since 12/23/2016. Extensive  pulmonary infiltration on the right which has not changed appreciably  since 12/23/2016. Pleural thickening and/or fluid on the right.  Previous sternotomy. Mild cardiomegaly. Left lung is clear.    ALONSO GUILLEN MD     *Note: Due to a large number of results and/or encounters for the requested time period, some results have not been displayed. A complete set of results can be found in Results Review.

## 2017-01-09 NOTE — PROGRESS NOTES
List all goals to be met before discharge home        1) No noted blood in stool or emesis: Met    2) Weakness improved (appropriate for d/c home with wife assist vs safe discharge plan):  Met. Patient accepted at the [facility] in [place].  Ride is set-up for 1000 tomorrow.      3) Hgb >8: Met- last Hgb 9.9

## 2017-01-09 NOTE — PROGRESS NOTES
JEREMY    I:  SW received a call from Admissions rep, Sue, confirming patient is going to [facility] in [place].  SW reviewed chart and confirmed this.  Ride is set for: 11:30 am. Will fax orders and PAS to: [phone] when completed.    P:  Continue to assist with discharge planning as needed.    FELIZ Johnson    ADDENDUM:  Patient is on 02 at the present time.  SW placed call to TCU admissions to inform them as they were not aware.  Patient does have his own portable tank and w/c in room for transport. [facility] aware  PAS-RR    D: Per DHS regulation, SW completed and submitted PAS-RR to [place] [employer] Direct Connect via the Senior [employer] Line.  PAS-RR confirmation # is : [id number]    I: SW spoke with patient and they are aware a PAS-RR has been submitted.  SW reviewed with patient that they may be contacted for a follow up appointment within 10 days of hospital discharge if their SNF stay is < 30 days.  Contact information for Senior [employer] Line was also provided.    A: patient verbalized understanding.    P: Further questions may be directed to Senior [employer] Line at #[phone], option #4 for PAS-RR staff.      No further SW interventions anticipated at this time.  FELIZ Johnson  [phone]

## 2017-01-12 NOTE — TELEPHONE ENCOUNTER
Ordered at OV 12-8-16 with Dr. Walker -  Followup will depend on the results of the Holter monitor.  If there is frequent nonsustained ventricular tachycardia, he may need to undergo stress testing to rule out progressive coronary artery disease.  Next OV 3-8-17 with Tahira.   Holter 1-9-17 noted showing Principle rhythm sinus with frequent PVC's. Average HR 78 range 62 - 104  There were 7932 ventricular ectopics pf which 4752 were isolated PVC's,10 couplets and 1 triplet,  442 bigeminy 32 trigeminy and 281 quadrigeminy events  There were 872 supraventricular ectopic, of which 871 were isolated PAC's 16 pair, 11 runs totaling 59 beats, longest run was 13 beats and fastest run had a rate of 150.  Patient pressed button once, Rhythm at that time was sinus with occasional isolated PVC.   / Aaron to review.

## 2017-01-13 NOTE — TELEPHONE ENCOUNTER
Attempted to contact patient to review holter results (no NSVT) and review Dr Walker's recommendation to return for f/u as planned (patient is scheduled 3/8/17 with TIAGO Tahira Damon). Left message for patient to call back

## 2017-01-15 NOTE — TELEPHONE ENCOUNTER
Call Type: Triage Call    Presenting Problem: Wife calling, says Jossue is at a TCU right now.  She noticed on Friday that his urine was a little cloudy. She told  the PA and was told that Jossue just needed to drink more. She is  asking if she should be concerned. Advised to ask for a urine test  to be done.  Triage Note:  Guideline Title: Information Only Call; No Symptom Triage (Adult)  Recommended Disposition: Provide Information or Advice Only  Original Inclination: Wanted to speak with a nurse  Override Disposition:  Intended Action: Follow advice given  Physician Contacted: No  Requesting information regarding scheduled exam, test or procedure; no triage  required. Information provided from approved resources or clinical experience. ?  YES  Requesting regular office appointment ? NO  Sign(s) or symptom(s) associated with a diagnosed condition or with a new illness  ? NO  Requesting information about provider, services or community resources ? NO  Call back to complete assessment/clarification of information from prior caller to  complete triage ? NO  Requesting information and provider is best resource; no triage required. ? NO  Caller not with patient and is unable to provide clinical information about  patient to facilitate triage. ? NO  Requesting provider information for recently scheduled test, procedure; no triage  required. Needed information not available per approved resources or clinical  experience. ? NO  Requesting information not available per approved reference or clinical  experience; no triage required. ? NO  Physician Instructions:  Care Advice:

## 2017-01-16 NOTE — TELEPHONE ENCOUNTER
Spoke with patient's spouse to review holter results. Patient is currently in TCU for anemia but they plan to keep March OV with TIAGO.

## 2017-01-17 NOTE — MR AVS SNAPSHOT
After Visit Summary   1/17/2017    Jossue Mendes    MRN: [id number]           Patient Information     Date Of Birth          1935        Visit Information        Provider Department      1/17/2017 10:45 AM Tani Wang MD [facility]         Follow-ups after your visit        Your next 10 appointments already scheduled     Jan 18, 2017  1:00 PM   [facility] Visit with Konstantin [facility]ago MD   [facility]    [address] [address] [id number] [phone]           Bring a current list of meds and any records pertaining to this visit.  For Physicals, please bring immunization records and any forms needing to be filled out.  Please arrive 10 minutes early to complete paperwork.            Jan 26, 2017  1:00 PM   Return Visit with Nelia Jenkins MD   [facility]    [address] [address] [place] [id number]   [phone]            Feb 02, 2017  9:00 AM   US AORTA/IVC/ILIAC DUPLEX COMPLETE with SHVUS4   [facility]    [address]  [place] [address]   [phone]           Please bring a list of your medicines (including vitamins, minerals and over-the-counter drugs). Also, tell your doctor about any allergies you may have. Wear comfortable clothes and leave your valuables at home.  Adults: No eating or drinking for 8 hours before the exam. You may take medicine with a small sip of water.  Children: - Children 6+ years: No food or drink for 6 hours before exam. - Children 1-5 years: No food or drink for 4 hours before exam. - Infants, breast-fed: may have breast milk up to 2 hours before exam. - Infants, formula: may have bottle until 4 hours before exam.  Please call the Imaging Department at your exam site with any questions.            Feb 02, 2017  9:45 AM   US  CAROTID BILATERAL with SHVUS4   [facility] ([facility])    [address]  [place] [address]   [phone]           Please bring a list of your medicines (including vitamins, minerals and over-the-counter drugs). Also, tell your doctor about any allergies you may have. Wear comfortable clothes and leave your valuables at home.  You do not need to do anything special to prepare for your exam.  Please call the Imaging Department at your exam site with any questions.            Feb 02, 2017 10:30 AM   Return Visit with Rj Quan MD   [facility] ([facility])    [address]  [place] [address] [phone]            Mar 08, 2017 10:50 AM   Return Visit with MIREYA Ulloa CNP   [facility] [place] ([facility])    [address]  [place] [id number] [phone]              Who to contact     If you have questions or need follow up information about today's clinic visit or your schedule please contact [facility] directly at [phone].  Normal or non-critical lab and imaging results will be communicated to you by MyChart, letter or phone within 4 business days after the clinic has received the results. If you do not hear from us within 7 days, please contact the clinic through MyChart or phone. If you have a critical or abnormal lab result, we will notify you by phone as soon as possible.  Submit refill requests through FastCall or call your pharmacy and they will forward the refill request to us. Please allow 3 business days for your refill to be completed.          Additional Information About Your Visit        FastCall Information     FastCall lets you send messages to your doctor, view your test results, renew your prescriptions, schedule appointments and more.  "To sign up, go to [email or web address] . Click on \"Log in\" on the left side of the screen, which will take you to the Welcome page. Then click on \"Sign up Now\" on the right side of the page.     You will be asked to enter the access code listed below, as well as some personal information. Please follow the directions to create your username and password.     Your access code is: [email or web address]  Expires: 2017 11:57 AM     Your access code will  in 90 days. If you need help or a new code, please call your [place] clinic or [phone].        Care EveryWhere ID     This is your Care EveryWhere ID. This could be used by other organizations to access your [place] medical records  [id number]        Your Vitals Were     Pulse Respirations                66 16           Blood Pressure from Last 3 Encounters:   17 90/60   17 151/65   17 156/70    Weight from Last 3 Encounters:   17 62.5 kg (137 lb 12.6 oz)   16 68.2 kg (150 lb 5.7 oz)   16 67.586 kg (149 lb)              Today, you had the following     No orders found for display       Primary Care Provider Office Phone # Fax #    Konstantin Rabago [phone]       [facility] [address] [address] [facility] [address]        Thank you!     Thank you for choosing [facility]  for your care. Our goal is always to provide you with excellent care. Hearing back from our patients is one way we can continue to improve our services. Please take a few minutes to complete the written survey that you may receive in the mail after your visit with us. Thank you!             Your Updated Medication List - Protect others around you: Learn how to safely use, store and throw away your medicines at www.disposemymeds.org.          This list is accurate as of: 17 11:40 AM.  Always use your most recent med list.                   Brand Name Dispense Instructions for use    ACETAMINOPHEN PO      Take " 1,000 mg by mouth 3 times daily as needed for pain       albuterol (2.5 MG/3ML) 0.083% neb solution     360 mL    Take 1 vial (2.5 mg) by nebulization every 2 hours as needed for wheezing or shortness of breath / dyspnea       amLODIPine 5 MG tablet    NORVASC    90 tablet    Take 1 tablet (5 mg) by mouth daily       apixaban ANTICOAGULANT 2.5 MG tablet    ELIQUIS    60 tablet    Take 1 tablet (2.5 mg) by mouth 2 times daily       aspirin 81 MG chewable tablet      Take 81 mg by mouth every evening       calcium 600 MG tablet      Take 1 tablet by mouth 2 times daily       clonazePAM 0.5 MG tablet    klonoPIN    30 tablet    Take 0.5 tablets (0.25 mg) by mouth 2 times daily as needed for anxiety       CRESTOR PO      Take 5 mg by mouth Mon, Wed, Fri evening       ferrous sulfate 325 (65 FE) MG tablet    IRON     Take 325 mg by mouth daily       gabapentin 100 MG capsule    NEURONTIN    270 capsule    Take 1 capsule (100 mg) by mouth 3 times daily       guaiFENesin 100 MG/5ML Syrp    ROBITUSSIN     Take 5 mLs by mouth every 6 hours as needed for cough       hydrALAZINE 25 MG tablet    APRESOLINE    540 tablet    Take 25 mg by mouth 2 times daily       HYDROcodone-acetaminophen 7.5-325 MG per tablet    NORCO    45 tablet    Take 0.5 tablets by mouth 3 times daily as needed for moderate to severe pain Take 0.5 tablets by mouth 3 times daily as needed for moderate to severe pain       IMODIUM OR      Take 1-2 tablets by mouth daily as needed (for loose stools)       ipratropium - albuterol 0.5 mg/2.5 mg/3 mL 0.5-2.5 (3) MG/3ML neb solution    DUONEB    360 mL    Take 1 vial (3 mLs) by nebulization 4 times daily       isosorbide mononitrate 30 MG 24 hr tablet    IMDUR    90 tablet    Take 1 tablet (30 mg) by mouth daily       metoprolol 25 MG tablet    LOPRESSOR    60 tablet    Take 0.5 tablets (12.5 mg) by mouth 2 times daily       nitroglycerin 0.4 MG sublingual tablet    NITROSTAT    90 tablet    Place 1 tablet under  the tongue every 5 minutes as needed for chest pain.       order for DME     1 Units    Equipment being ordered: Nebulizer       pantoprazole 40 MG EC tablet    PROTONIX    90 tablet    Take 1 tablet (40 mg) by mouth daily Take 30-60 minutes before a meal.       tamsulosin 0.4 MG capsule    FLOMAX    90 capsule    Take 1 capsule (0.4 mg) by mouth daily       VITAMIN D (CHOLECALCIFEROL) PO      Take 2,000 Units by mouth daily

## 2017-01-17 NOTE — TELEPHONE ENCOUNTER
Ok to cancel appointment if patient is improving or stable. If not, patient should follow up.    Konstantin Rabago MD

## 2017-01-17 NOTE — TELEPHONE ENCOUNTER
Called and left detailed message for Norma regarding Dr. Rabago's note below. Norma will need to call the appt line to cancel the appt if they feel the appt is not needed.  Eric Bailey,  [facility]

## 2017-01-17 NOTE — TELEPHONE ENCOUNTER
Reason for Call:  Patient's spouse, Norma called regarding patient's hospital follow up  appointment today with Dr. Rabago at 1:00.  Patient is at a TCU in [place].  She is asking if he should still come for appointment.    Detailed comments: Please call to advise.    Phone Number Patient can be reached at: Home number on file [phone] (home)    Best Time: anytime    Can we leave a detailed message on this number? YES     Thank you,    Call taken on 1/17/2017 at 7:47 AM by Maggie Peterson

## 2017-01-17 NOTE — NURSING NOTE
"Chief Complaint   Patient presents with     RECHECK       Initial BP 90/60 mmHg  Pulse 66  Resp 16 Estimated body mass index is 20.34 kg/(m^2) as calculated from the following:    Height as of 1/6/17: 1.753 m (5' 9.02\").    Weight as of 1/6/17: 62.5 kg (137 lb 12.6 oz).  BP completed using cuff size: regular  Cheri Peterson RN       "

## 2017-01-17 NOTE — PROGRESS NOTES
SUBJECTIVE:  Mr. Jossue Alvarez returns to clinic today for urinary retention workup.  He was hospitalized recently for pneumonia and is quite weak.  He has not been able to urinate much, according to his wife who is with him today.  He has history of urinary retention in the past when he was ill.  He does have some low abdominal pain which bothers him.  He has no fever.  He feels weak, however.      Bladder scan today showed residual urine of over 600 mL.        ASSESSMENT:  Urinary retention.      RECOMMENDATION:  Charles catheter was placed today.  This can be changed once a month.  He looks pretty weak.  I suspect that he may not survive much longer.  This is for comfort care mainly.         JERZY DIGGS MD             D: 2017 11:45   T: 2017 12:24   MT: ANDRADE      Name:     JOSSUE ALVAREZ   MRN:      [id number]        Account:      [id number]   :      1935           Visit Date:   2017      Document: [id number]       cc: Konstantin Rabago MD

## 2017-01-17 NOTE — PROGRESS NOTES
Chief Complaint   Patient presents with     RECHECK       Jossue Mendes is a 81 year old male who presents today for follow up of   Chief Complaint   Patient presents with     RECHECK    See dictated note     Current Outpatient Prescriptions   Medication Sig Dispense Refill     HYDROcodone-acetaminophen (NORCO) 7.5-325 MG per tablet Take 0.5 tablets by mouth 3 times daily as needed for moderate to severe pain Take 0.5 tablets by mouth 3 times daily as needed for moderate to severe pain 45 tablet 0     clonazePAM (KLONOPIN) 0.5 MG tablet Take 0.5 tablets (0.25 mg) by mouth 2 times daily as needed for anxiety 30 tablet 0     Rosuvastatin Calcium (CRESTOR PO) Take 5 mg by mouth Mon, Wed, Fri evening       albuterol (2.5 MG/3ML) 0.083% neb solution Take 1 vial (2.5 mg) by nebulization every 2 hours as needed for wheezing or shortness of breath / dyspnea 360 mL 0     ipratropium - albuterol 0.5 mg/2.5 mg/3 mL (DUONEB) 0.5-2.5 (3) MG/3ML neb solution Take 1 vial (3 mLs) by nebulization 4 times daily 360 mL 0     order for DME Equipment being ordered: Nebulizer 1 Units 0     metoprolol (LOPRESSOR) 25 MG tablet Take 0.5 tablets (12.5 mg) by mouth 2 times daily 60 tablet 1     gabapentin (NEURONTIN) 100 MG capsule Take 1 capsule (100 mg) by mouth 3 times daily 270 capsule 3     hydrALAZINE (APRESOLINE) 25 MG tablet Take 25 mg by mouth 2 times daily  540 tablet 3     calcium 600 MG tablet Take 1 tablet by mouth 2 times daily       apixaban ANTICOAGULANT (ELIQUIS) 2.5 MG tablet Take 1 tablet (2.5 mg) by mouth 2 times daily 60 tablet 11     tamsulosin (FLOMAX) 0.4 MG 24 hr capsule Take 1 capsule (0.4 mg) by mouth daily 90 capsule 3     isosorbide mononitrate (IMDUR) 30 MG 24 hr tablet Take 1 tablet (30 mg) by mouth daily 90 tablet 3     pantoprazole (PROTONIX) 40 MG enteric coated tablet Take 1 tablet (40 mg) by mouth daily Take 30-60 minutes before a meal. 90 tablet 3     amLODIPine (NORVASC) 5 MG tablet Take 1 tablet (5  mg) by mouth daily 90 tablet 3     aspirin 81 MG chewable tablet Take 81 mg by mouth every evening        guaiFENesin (ROBITUSSIN) 100 MG/5ML SYRP Take 5 mLs by mouth every 6 hours as needed for cough        ACETAMINOPHEN PO Take 1,000 mg by mouth 3 times daily as needed for pain       VITAMIN D, CHOLECALCIFEROL, PO Take 2,000 Units by mouth daily        ferrous sulfate 325 (65 FE) MG tablet Take 325 mg by mouth daily       Loperamide HCl (IMODIUM OR) Take 1-2 tablets by mouth daily as needed (for loose stools)        nitroGLYCERIN (NITROSTAT) 0.4 MG SL tablet Place 1 tablet under the tongue every 5 minutes as needed for chest pain. 90 tablet 0     Allergies   Allergen Reactions     Blood Transfusion Related (Informational Only) Other (See Comments)     Patient has a history of a clinically significant antibody against RBC antigens.  A delay in compatible RBCs may occur.     Remeron Soltab      Hostility, very aggresive     Methotrexate      Narcosis of jaw     Atorvastatin      Legs jump     Bystolic [Nebivolol Hcl]      Cefuroxime Other (See Comments)     Weakness       Clonidine      hostility     Relafen [Nabumetone]      Temazepam      Cant sleep     Tramadol Itching     And dizzy     Clindamycin Rash     Back pain     Indocin Rash     Rash and blisters     Levaquin [Levofloxacin] Rash     Penicillin G Rash     Prilosec [Omeprazole Magnesium] Rash      Past Medical History   Diagnosis Date     Abdominal aneurysm (H)      s/p repair (EVAR) 2012     Hypertension      Rheumatoid arthritis(714.0)      previously on meloxicam     S/P AKA (above knee amputation) (H)      rt     Gastro-oesophageal reflux disease      Long term current use of antithrombotics/antiplatelets      81 mg asa     Chronic infection      coccyx wound,healing     CHF (congestive heart failure) (H)      Bruit      carotid endarterectomy     Coronary artery disease      CABG 2012: LIMA to LAD, SVG to PDA and OM2     Glaucoma      Nonsenile  cataract      CVA (cerebral infarction)      Atrial fibrillation (H)      Asbestos exposure      COPD (chronic obstructive pulmonary disease) (H)      Renal disease      hx  of dialysis, not presently. CKD stage 3; 10/20/16 had kim catheter but no dx for 6 years     Blind left eye      Diabetes mellitus (H)      Diet controlled (10/16)     Diabetic retinopathy (H)      Pneumonia      Past Surgical History   Procedure Laterality Date     Gi surgery       hx of GI tube, not presently     Orthopedic surgery       left grt toe  removed, AKA right, back surgery     Endovascular repair aneurysm abdominal aorta  2/24/2012     Procedure:ENDOVASCULAR REPAIR ANEURYSM ABDOMINAL AORTA; ENDOVASCULAR ABDOMINAL AORTIC ANEURYSM REPAIR ; Surgeon:KARSTEN LARA; Location: OR     Amputation       right below the knee     Vascular surgery       right endarterectomy     Appendectomy       Bypass graft artery coronary  9/28/2012     CABG 2012: LIMA to LAD, SVG to PDA and OM2     Intravitreal injection gas/tpa/methotrexate/antibiotics  5/29/2014     Procedure: INTRAVITREAL INJECTION GAS/TPA/METHOTREXATE/ANTIBIOTICS;  Surgeon: Brendon Catalan MD;  Location: [facility]     Esophagoscopy, gastroscopy, duodenoscopy (egd), combined  6/18/2014     Procedure: COMBINED ESOPHAGOSCOPY, GASTROSCOPY, DUODENOSCOPY (EGD), BIOPSY SINGLE OR MULTIPLE;  Surgeon: Kendal Sow MD;  Location: [facility]     Coronary angiography adult order  9/2012     Phacoemulsification clear cornea with standard intraocular lens implant Right 10/20/2016     Procedure: PHACOEMULSIFICATION CLEAR CORNEA WITH STANDARD INTRAOCULAR LENS IMPLANT;  Surgeon: Bandar Pleitez MD;  Location: [facility]     Keratotomy arcuate with femtosecond laser/imaging for atiol Right 10/20/2016     Procedure: KERATOTOMY ARCUATE WITH FEMTOSECOND LASER/IMAGING FOR ATIOL;  Surgeon: Bandar Pleitez MD;  Location: [facility]     Cataract iol, rt/lt       Family History    Problem Relation Age of Onset     C.A.D. Mother      C.A.D. Father      CANCER Sister      DIABETES Other      Hypertension No family hx of      CEREBROVASCULAR DISEASE No family hx of      Thyroid Disease No family hx of      Glaucoma No family hx of      Macular Degeneration No family hx of      Social History     Social History     Marital Status:      Spouse Name: N/A     Number of Children: N/A     Years of Education: N/A     Social History Main Topics     Smoking status: Former Smoker -- 1.00 packs/day for 40 years     Types: Cigarettes     Quit date: 01/01/1986     Smokeless tobacco: Never Used     Alcohol Use: No     Drug Use: No     Sexual Activity: Not Currently     Other Topics Concern      Service Yes     Blood Transfusions Yes     Caffeine Concern No     Sleep Concern Yes     Stress Concern No     Weight Concern No     Special Diet No     Exercise Yes     x2 a week, new step, upper body strength     Seat Belt Yes     Social History Narrative    Served in the [employer] x[employer] years, mostly in [place].        REVIEW OF SYSTEMS  =================  C: NEGATIVE for fever, chills, change in weight  I: NEGATIVE for worrisome rashes, moles or lesions  E/M: NEGATIVE for ear, mouth and throat problems  R: NEGATIVE for significant cough or SHORTNESS OF BREATH,   CV: NEGATIVE for chest pain, palpitations or peripheral edema  GI: NEGATIVE for nausea, abdominal pain, heartburn, or change in bowel habits  NEURO: NEGATIVE any motor/sensory changes  PSYCH: NEGATIVE for recent mood disorder    Physical Exam:  BP 90/60 mmHg  Pulse 66  Resp 16   Patient is pleasant, tired looking.  Lung: no evidence of respiratory distress    Abdomen: Soft, nondistended, non tender. No masses. No rebound or guarding.   Exam: bladder distension.    Skin: Warm and dry.  No redness.  Psych: normal mood and affect  Neuro: alert and oriented    Assessment/Plan:   (R33.9) Retention of urine  (primary encounter diagnosis)  Comment:     Plan: INSERT BLADDER CATH, TEMP INDWELL SIMPLE         (BHAKTA), Residual Urine By Bladder Scan         See dictated note

## 2017-01-18 NOTE — TELEPHONE ENCOUNTER
Detailed message left on Norma's voice mail per provider documentation. [facility] clinic number provided for any additional questions/concerns.   Ivett Coburn RN

## 2017-01-18 NOTE — TELEPHONE ENCOUNTER
Notes Recorded by Konstantin Rabago MD on 1/18/2017 at 2:46 PM  Please notify patient that hemoglobin stable compared to yesterday that was done at the rehab center. Please have rehab center do another CBC w/plt in 1 week and fax us the results.    Konstantin Rabago MD    This writer attempted to contact Jossue on 01/18/2017.    Was call answered?  No.  Left message on voicemail with information to call me back. Patient needs to receive message above.    If patient calls back, please Contact Clinic RN team. If no one available, send encounter message.    Ivett Coburn RN

## 2017-01-18 NOTE — PROGRESS NOTES
SUBJECTIVE:                                                    Jossue Mendes is a 81 year old male who presents to clinic today for the following health issues:          Hospital Follow-up Visit:    Hospital/Nursing Home/IP Rehab Facility: [facility]  Date of Admission: 1/6/17  Date of Discharge: 1/8/17  Reason(s) for Admission: Low hgb            Problems taking medications regularly:  None       Medication changes since discharge: None       Problems adhering to non-medication therapy:  None    Summary of hospitalization:  [facility] discharge summary reviewed  Diagnostic Tests/Treatments reviewed.  Follow up needed: none  Other Healthcare Providers Involved in Patient s Care:         None  Update since discharge: stable.     Post Discharge Medication Reconciliation: discharge medications reconciled, continue medications without change.  Plan of care communicated with patient     Coding guidelines for this visit:  Type of Medical   Decision Making Face-to-Face Visit       within 7 Days of discharge Face-to-Face Visit        within 14 days of discharge   Moderate Complexity [id number]   High Complexity 21903 22379            Problem list and histories reviewed & adjusted, as indicated.  Additional history: as documented    Problem list, Medication list, Allergies, and Medical/Social/Surgical histories reviewed in EPIC and updated as appropriate.    ROS:  Constitutional, HEENT, cardiovascular, pulmonary, GI, , musculoskeletal, neuro, skin, endocrine and psych systems are negative, except as otherwise noted.    OBJECTIVE:                                                    /68 mmHg  Pulse 96  Temp(Src) 98.4  F (36.9  C) (Oral)  Resp 16  SpO2 96%  There is no weight on file to calculate BMI.  GENERAL: pale, weak-looking  NECK: no adenopathy, no asymmetry, masses, or scars and thyroid normal to palpation  RESP: lungs clear to auscultation - no rales, rhonchi or wheezes  CV:  regular rate and rhythm, normal S1 S2, no S3 or S4, no murmur, click or rub, no peripheral edema and peripheral pulses strong  ABDOMEN: soft, nontender, no hepatosplenomegaly, no masses and bowel sounds normal  MS: no gross musculoskeletal defects noted, no edema    Diagnostic Test Results:  none      ASSESSMENT/PLAN:                                                      1. Anemia due to other cause  Hemoglobin yesterday at 8.3. Multifactorial. Has CKD. EPO candidate? Patient will follow up with nephrology. Stool for occult blood negative in hospital. Will recheck today. Discussed scoping today but patient does not want this unless stool test is positive. This is reasonable. May need transfusion.  - CBC with platelets  - Occult blood stool 1-3 spec; Future    See Patient Instructions    Konstantin Rabago MD, MD  [facility]

## 2017-01-18 NOTE — TELEPHONE ENCOUNTER
Pt has appt with Dr. Lindo kidney  2/15/17  Is that ok?    Call if you want to try to get him in sooner    Thank you  Norma () [phone] (H)

## 2017-01-18 NOTE — MR AVS SNAPSHOT
After Visit Summary   1/18/2017    Jossue Mendes    MRN: [id number]           Patient Information     Date Of Birth          1935        Visit Information        Provider Department      1/18/2017 1:00 PM Konstantin Rabago MD [facility]        Today's Diagnoses     Anemia due to other cause    -  1        Follow-ups after your visit        Your next 10 appointments already scheduled     Jan 26, 2017  1:00 PM   Return Visit with Nelia Jenkins MD   [facility] ([facility])    [address] [place] [address]   [phone]            Feb 02, 2017  9:00 AM   US AORTA/IVC/ILIAC DUPLEX COMPLETE with SHVUS4   [facility] ([facility])    [address]  [facility] [address]   [phone]           Please bring a list of your medicines (including vitamins, minerals and over-the-counter drugs). Also, tell your doctor about any allergies you may have. Wear comfortable clothes and leave your valuables at home.  Adults: No eating or drinking for 8 hours before the exam. You may take medicine with a small sip of water.  Children: - Children 6+ years: No food or drink for 6 hours before exam. - Children 1-5 years: No food or drink for 4 hours before exam. - Infants, breast-fed: may have breast milk up to 2 hours before exam. - Infants, formula: may have bottle until 4 hours before exam.  Please call the Imaging Department at your exam site with any questions.            Feb 02, 2017  9:45 AM   US CAROTID BILATERAL with SHVUS4   [facility] ([facility])    [address]  [facility] [address]   [phone]           Please bring a list of your medicines (including vitamins, minerals and over-the-counter drugs). Also, tell your doctor about any allergies you may have. Wear comfortable  "clothes and leave your valuables at home.  You do not need to do anything special to prepare for your exam.  Please call the Imaging Department at your exam site with any questions.            Feb 02, 2017 10:30 AM   Return Visit with Rj Quan MD   [facility] ([facility])    [address]  [facility] [id number] [phone]            Mar 08, 2017 10:50 AM   Return Visit with MIREYA Ulloa CNP   [facility]YSIC[facility] AT [place] ([facility])    [address]  [facility] [id number] [phone]              Who to contact     If you have questions or need follow up information about today's clinic visit or your schedule please contact [facility] directly at [phone].  Normal or non-critical lab and imaging results will be communicated to you by MyChart, letter or phone within 4 business days after the clinic has received the results. If you do not hear from us within 7 days, please contact the clinic through [employer] or phone. If you have a critical or abnormal lab result, we will notify you by phone as soon as possible.  Submit refill requests through [employer] or call your pharmacy and they will forward the refill request to us. Please allow 3 business days for your refill to be completed.          Additional Information About Your Visit        [employer] Information     [employer] lets you send messages to your doctor, view your test results, renew your prescriptions, schedule appointments and more. To sign up, go to [email or web address] . Click on \"Log in\" on the left side of the screen, which will take you to the Welcome page. Then click on \"Sign up Now\" on the right side of the page.     You will be asked to enter the access code listed below, as well as some personal information. Please follow the directions to create your username and " password.     Your access code is: [email or web address]  Expires: 2017  1:05 PM     Your access code will  in 90 days. If you need help or a new code, please call your [facility] or [phone].        Care EveryWhere ID     This is your Care EveryWhere ID. This could be used by other organizations to access your [place] medical records          Your Vitals Were     Pulse Temperature Respirations Pulse Oximetry          96 98.4  F (36.9  C) (Oral) 16 96%         Blood Pressure from Last 3 Encounters:   17 118/68   17 90/60   17 151/65    Weight from Last 3 Encounters:   17 137 lb 12.6 oz (62.5 kg)   16 150 lb 5.7 oz (68.2 kg)   16 149 lb (67.586 kg)              We Performed the Following     CBC with platelets        Primary Care Provider Office Phone # Fax #    Konstantin Rabago [phone]       [facility] [address] [facility] [address]        Thank you!     Thank you for choosing [facility]  for your care. Our goal is always to provide you with excellent care. Hearing back from our patients is one way we can continue to improve our services. Please take a few minutes to complete the written survey that you may receive in the mail after your visit with us. Thank you!             Your Updated Medication List - Protect others around you: Learn how to safely use, store and throw away your medicines at www.disposemymeds.org.          This list is accurate as of: 17  1:05 PM.  Always use your most recent med list.                   Brand Name Dispense Instructions for use    ACETAMINOPHEN PO      Take 1,000 mg by mouth 3 times daily as needed for pain       albuterol (2.5 MG/3ML) 0.083% neb solution     360 mL    Take 1 vial (2.5 mg) by nebulization every 2 hours as needed for wheezing or shortness of breath / dyspnea       amLODIPine 5 MG tablet    NORVASC    90 tablet    Take 1 tablet (5 mg) by mouth  daily       apixaban ANTICOAGULANT 2.5 MG tablet    ELIQUIS    60 tablet    Take 1 tablet (2.5 mg) by mouth 2 times daily       aspirin 81 MG chewable tablet      Take 81 mg by mouth every evening       calcium 600 MG tablet      Take 1 tablet by mouth 2 times daily       clonazePAM 0.5 MG tablet    klonoPIN    30 tablet    Take 0.5 tablets (0.25 mg) by mouth 2 times daily as needed for anxiety       CRESTOR PO      Take 5 mg by mouth Mon, Wed, Fri evening       ferrous sulfate 325 (65 FE) MG tablet    IRON     Take 325 mg by mouth daily       gabapentin 100 MG capsule    NEURONTIN    270 capsule    Take 1 capsule (100 mg) by mouth 3 times daily       guaiFENesin 100 MG/5ML Syrp    ROBITUSSIN     Take 5 mLs by mouth every 6 hours as needed for cough       hydrALAZINE 25 MG tablet    APRESOLINE    540 tablet    Take 25 mg by mouth 2 times daily       HYDROcodone-acetaminophen 7.5-325 MG per tablet    NORCO    45 tablet    Take 0.5 tablets by mouth 3 times daily as needed for moderate to severe pain Take 0.5 tablets by mouth 3 times daily as needed for moderate to severe pain       IMODIUM OR      Take 1-2 tablets by mouth daily as needed (for loose stools)       ipratropium - albuterol 0.5 mg/2.5 mg/3 mL 0.5-2.5 (3) MG/3ML neb solution    DUONEB    360 mL    Take 1 vial (3 mLs) by nebulization 4 times daily       isosorbide mononitrate 30 MG 24 hr tablet    IMDUR    90 tablet    Take 1 tablet (30 mg) by mouth daily       metoprolol 25 MG tablet    LOPRESSOR    60 tablet    Take 0.5 tablets (12.5 mg) by mouth 2 times daily       nitroglycerin 0.4 MG sublingual tablet    NITROSTAT    90 tablet    Place 1 tablet under the tongue every 5 minutes as needed for chest pain.       order for DME     1 Units    Equipment being ordered: Nebulizer       pantoprazole 40 MG EC tablet    PROTONIX    90 tablet    Take 1 tablet (40 mg) by mouth daily Take 30-60 minutes before a meal.       tamsulosin 0.4 MG capsule    FLOMAX    90  capsule    Take 1 capsule (0.4 mg) by mouth daily       VITAMIN D (CHOLECALCIFEROL) PO      Take 2,000 Units by mouth daily

## 2017-01-18 NOTE — TELEPHONE ENCOUNTER
Ok with this. Please let Norma know that hemoglobin is stable. We'll have rehab recheck hemoglobin in 1 week.    Konstantin Rabago MD

## 2017-01-18 NOTE — NURSING NOTE
"Chief Complaint   Patient presents with     Hospital F/U      Seen at [facility]       Initial /68 mmHg  Pulse 96  Temp(Src) 98.4  F (36.9  C) (Oral)  Resp 16  SpO2 96% Estimated body mass index is 20.34 kg/(m^2) as calculated from the following:    Height as of 1/6/17: 5' 9.02\" (1.753 m).    Weight as of 1/6/17: 137 lb 12.6 oz (62.5 kg).  BP completed using cuff size: regular Left arm  Omni Siegel, CMA      "

## 2017-01-19 NOTE — TELEPHONE ENCOUNTER
This writer attempted to contact Jossue on 01/19/2017.    Was call answered?  No.  Left message on voicemail with information to call me back.    If patient calls back, please Contact Clinic RN team. If no one available, send encounter message    Caitie Reyes

## 2017-01-20 NOTE — TELEPHONE ENCOUNTER
..Reason for Call:   call back    Detailed comments: would like to speak with Dr Rabago  *did not wish to be transferred to triage since the RN who called was not in yet    Phone Number Patient can be reached at: Home number on file [phone] (home)    Best Time: until 9:45 am    Can we leave a detailed message on this number? did not indicate    Call taken on 1/20/2017 at 7:35 AM by Brittany Houser

## 2017-01-20 NOTE — TELEPHONE ENCOUNTER
Norma notified, JORGE L on file.  Norma has some questions for Dr. Rabago and would like to speak to him directly.      If Norma is not at the house, [phone], she should have her cellphone on her.  Cell is  [phone].    Routing to provider. Please advise.  Caitie Reyes RN

## 2017-01-26 NOTE — NURSING NOTE
"Jossue Mendes's goals for this visit include:   Chief Complaint   Patient presents with     RECHECK       He requests these members of his care team be copied on today's visit information: Yes    PCP: Konstantin Rabago    Referring Provider:  No referring provider defined for this encounter.    Chief Complaint   Patient presents with     RECHECK       Initial /69 mmHg  Pulse 80  Temp(Src) 97.5  F (36.4  C) (Oral)  Resp 18  Wt 62.596 kg (138 lb)  SpO2 97% Estimated body mass index is 20.37 kg/(m^2) as calculated from the following:    Height as of 1/6/17: 1.753 m (5' 9.02\").    Weight as of this encounter: 62.596 kg (138 lb).  BP completed using cuff size: regular    "

## 2017-01-26 NOTE — MR AVS SNAPSHOT
After Visit Summary   1/26/2017    Jossue Mendes    MRN: [id number]           Patient Information     Date Of Birth          1935        Visit Information        Provider Department      1/26/2017 1:00 PM Nelia Jenkins MD [facility]         Follow-ups after your visit        Follow-up notes from your care team     Return in about 3 months (around 4/26/2017).      Your next 10 appointments already scheduled     Feb 02, 2017  9:00 AM   US AORTA/IVC/ILIAC DUPLEX COMPLETE with SHVUS4   [facility]ound ([facility])    [address]  [facility] [address]   [phone]           Please bring a list of your medicines (including vitamins, minerals and over-the-counter drugs). Also, tell your doctor about any allergies you may have. Wear comfortable clothes and leave your valuables at home.  Adults: No eating or drinking for 8 hours before the exam. You may take medicine with a small sip of water.  Children: - Children 6+ years: No food or drink for 6 hours before exam. - Children 1-5 years: No food or drink for 4 hours before exam. - Infants, breast-fed: may have breast milk up to 2 hours before exam. - Infants, formula: may have bottle until 4 hours before exam.  Please call the Imaging Department at your exam site with any questions.            Feb 02, 2017  9:45 AM   US CAROTID BILATERAL with SHVUS4   [facility]ound ([facility])    [address]  [facility] [address]   [phone]           Please bring a list of your medicines (including vitamins, minerals and over-the-counter drugs). Also, tell your doctor about any allergies you may have. Wear comfortable clothes and leave your valuables at home.  You do not need to do anything special to prepare for your exam.  Please call the Imaging Department at your exam site with any  questions.            Feb 02, 2017 10:30 AM   Return Visit with Rj Quan MD   [facility] ([facility])    [address]  [facility] [id number] [phone]            Mar 08, 2017 10:50 AM   Return Visit with MIREYA Ulloa CNP   [facility]YS[facility] HE[facility] AT [place] ([facility])    [address]  [facility] [id number] [phone]            Apr 13, 2017  3:30 PM   Return Visit with Nelia Jenkins MD   [facility]    [address] [place] [address] [phone]              Who to contact     If you have questions or need follow up information about today's clinic visit or your schedule please contact [facility] directly at [phone].  Normal or non-critical lab and imaging results will be communicated to you by MyChart, letter or phone within 4 business days after the clinic has received the results. If you do not hear from us within 7 days, please contact the clinic through MyChart or phone. If you have a critical or abnormal lab result, we will notify you by phone as soon as possible.  Submit refill requests through [employer] or call your pharmacy and they will forward the refill request to us. Please allow 3 business days for your refill to be completed.          Additional Information About Your Visit        MedNews Information     [employer]News is an electronic gateway that provides easy, online access to your medical records. With MedNews, you can request a clinic appointment, read your test results, renew a prescription or communicate with your care team.     To sign up for [employer] visit the website at [email or web address]   You will be asked to enter the access code listed below, as well as some personal information. Please follow the directions to create your  username and password.     Your access code is: [email or web address]  Expires: 2017  1:05 PM     Your access code will  in 90 days. If you need help or a new code, please contact your [facility] or call [phone] for assistance.        Care EveryWhere ID     This is your Care EveryWhere ID. This could be used by other organizations to access your [place] medical records          Your Vitals Were     Pulse Temperature Respirations Pulse Oximetry          80 97.5  F (36.4  C) (Oral) 18 97%         Blood Pressure from Last 3 Encounters:   17 117/69   17 118/68   17 90/60    Weight from Last 3 Encounters:   17 62.596 kg (138 lb)   17 62.5 kg (137 lb 12.6 oz)   16 68.2 kg (150 lb 5.7 oz)              Today, you had the following     No orders found for display       Primary Care Provider Office Phone # Fax #    Konstantin Rabago [phone]       [facility] [address]  [facility] [address]        Thank you!     Thank you for choosing [facility]  for your care. Our goal is always to provide you with excellent care. Hearing back from our patients is one way we can continue to improve our services. Please take a few minutes to complete the written survey that you may receive in the mail after your visit with us. Thank you!             Your Updated Medication List - Protect others around you: Learn how to safely use, store and throw away your medicines at www.disposemymeds.org.          This list is accurate as of: 17  1:40 PM.  Always use your most recent med list.                   Brand Name Dispense Instructions for use    ACETAMINOPHEN PO      Take 1,000 mg by mouth 3 times daily as needed for pain       albuterol (2.5 MG/3ML) 0.083% neb solution     360 mL    Take 1 vial (2.5 mg) by nebulization every 2 hours as needed for wheezing or shortness of breath / dyspnea       amLODIPine 5  MG tablet    NORVASC    90 tablet    Take 1 tablet (5 mg) by mouth daily       apixaban ANTICOAGULANT 2.5 MG tablet    ELIQUIS    60 tablet    Take 1 tablet (2.5 mg) by mouth 2 times daily       aspirin 81 MG chewable tablet      Take 81 mg by mouth every evening       calcium 600 MG tablet      Take 1 tablet by mouth 2 times daily       clonazePAM 0.5 MG tablet    klonoPIN    30 tablet    Take 0.5 tablets (0.25 mg) by mouth 2 times daily as needed for anxiety       CRESTOR PO      Take 5 mg by mouth Mon, Wed, Fri evening       ferrous sulfate 325 (65 FE) MG tablet    IRON     Take 325 mg by mouth daily       gabapentin 100 MG capsule    NEURONTIN    270 capsule    Take 1 capsule (100 mg) by mouth 3 times daily       guaiFENesin 100 MG/5ML Syrp    ROBITUSSIN     Take 5 mLs by mouth every 6 hours as needed for cough       hydrALAZINE 25 MG tablet    APRESOLINE    540 tablet    Take 25 mg by mouth 2 times daily       HYDROcodone-acetaminophen 7.5-325 MG per tablet    NORCO    45 tablet    Take 0.5 tablets by mouth 3 times daily as needed for moderate to severe pain Take 0.5 tablets by mouth 3 times daily as needed for moderate to severe pain       IMODIUM OR      Take 1-2 tablets by mouth daily as needed (for loose stools)       ipratropium - albuterol 0.5 mg/2.5 mg/3 mL 0.5-2.5 (3) MG/3ML neb solution    DUONEB    360 mL    Take 1 vial (3 mLs) by nebulization 4 times daily       isosorbide mononitrate 30 MG 24 hr tablet    IMDUR    90 tablet    Take 1 tablet (30 mg) by mouth daily       metoprolol 25 MG tablet    LOPRESSOR    60 tablet    Take 0.5 tablets (12.5 mg) by mouth 2 times daily       nitroglycerin 0.4 MG sublingual tablet    NITROSTAT    90 tablet    Place 1 tablet under the tongue every 5 minutes as needed for chest pain.       order for DME     1 Units    Equipment being ordered: Nebulizer       pantoprazole 40 MG EC tablet    PROTONIX    90 tablet    Take 1 tablet (40 mg) by mouth daily Take 30-60  minutes before a meal.       tamsulosin 0.4 MG capsule    FLOMAX    90 capsule    Take 1 capsule (0.4 mg) by mouth daily       VITAMIN D (CHOLECALCIFEROL) PO      Take 2,000 Units by mouth daily

## 2017-01-26 NOTE — TELEPHONE ENCOUNTER
Reason for Call:  Other appointment    Detailed comments: Wife is calling. Would like to know if you would like to see patient again for a follow up or not. Call to advise. Thank you.    Phone Number Patient can be reached at: Home number on file [phone] (home)    Best Time: any    Can we leave a detailed message on this number? YES    Call taken on 1/26/2017 at 9:40 AM by Apolonia Reid

## 2017-01-26 NOTE — PROGRESS NOTES
CHIEF COMPLAINT:  Recurrent pneumonia and asbestos associated pleural effusion.      HISTORY OF PRESENT ILLNESS:  Jossue Mendes is a 81-year-old man known to me from previous visits.  He has had recurrent presumed aspiration pneumonias and history of asbestos related pleural effusions and rounded atelectasis.  Since our last visit he had an additional episode of pneumonia.  He was hospitalized at [facility] and underwent bronchoscopy.  At that time he had mucoid and mucopurulent secretions in his lower airways.  These were removed.  The BAL was performed and he had 56 white cells on BAL.  Cultures remain negative.  He was discharged to a rehab facility.  He is currently receiving speech language therapy on a daily basis.  He was discharged on albuterol, ipratropium nebulizers which he has been using on a 3 times a day basis.  He finds the device cumbersome, it does not improve his breathing and dislikes using the nebulizer.  He has also been using oxygen via nasal cannula, which is new following discharge.  He has been evaluated for low hemoglobin, has received 2 units of packed red cells for anemia.  He says that overall his energy is poor.  He has had some weight loss related to low appetite and the unappetizing nature of food at his rehab center.  His wife is bringing him in food to eat.      PAST MEDICAL HISTORY:   1.  CKD.   2.  Rheumatoid arthritis.   3.  Coronary artery disease, status post CABG in 2012.   4.  Heart failure with preserved EF 55-60%.   5.  Diabetes.   6.  AAA, status post AVR in 2009.   7.  History of carotid endarterectomy.   8.  Right pleural effusion since 2011.   9.  Right leg amputation.   10.  History of cerebrovascular accident.   11.  asbestos associated pleural plaquing.      FAMILY HISTORY:  Mother had coronary artery disease.      SOCIAL HISTORY:  The patient has approximately 60-pack-year smoking history, quit in 1986, served 20 years in the [employer].      REVIEW OF SYSTEMS:  A  full 14-point review of systems was done and is negative except as noted above in the HPI.      PHYSICAL EXAMINATION:   VITAL SIGNS:  Blood pressure 117/69, pulse is 80, respiratory rate is 18, SpO2 is 97% on 1 liter via nasal cannula.   GENERAL APPEARANCE:  Examined in a wheelchair.   EYES:  PERRL.  No conjunctival injection.   RESPIRATORY:  Decreased breath sounds at the right base.  Scattered rales, clear with coughing, expectoration of tan sputum noted during the exam.   CARDIOVASCULAR:  Regular rate and rhythm, normal S1, S2, no murmurs, rubs or gallops.      DIAGNOSTICS:      Most recent CT chest 11/15/2016 formal impression by Dr. Antonio:   1.  Innumerable solid and ground-glass pulmonary nodules right lung is slightly decreased compared to 5/12/2016 and 4/18/2016, most compatible with resolving infectious process.   2.  Bilateral plaques and chronic heterogenous pleural effusions with intermediate density, right greater than left.  The left effusion is likely loculated.  Findings raise suspicion for asbestos related pleural disease.   3.  Right basilar rounded density most compatible with round atelectasis.  Tumor is less likely.   4.  Extensive atherosclerotic disease in the chest and abdomen, notably there is severe atherosclerotic calcifications in the major artery branches of the aortic arch and visceral arteries.   5.  Scattered bronchial wall thickening suggestive of bronchitis.      ASSESSMENT AND PLAN:  The patient is a 81-year-old man here for ongoing followup of COPD and asbestos associated pleural disease.     1.  Recurrent pneumonia.  These have been strongly suspected to be related to a history of aspiration.  He has had extensive evaluation for this in the past and is currently doing well and receiving daily speech therapy.     2.  Chronic obstructive pulmonary disease.  The patient likely has a history of COPD based on imaging findings and previous PFTs demonstrating mild obstruction.  He has  been asymptomatic and I have not initiated him on inhalers in the past.  He has been using nebulizers for the last few months since discharge from the hospital.  Since he feels no symptomatic benefit from use of the inhaler we will discontinue them at this time.     3.  Hypoxia.  The patient was discharged from the hospital on oxygen.   I wrote an order for his rehabilitation center to check his SpO2 3 times a day off of oxygen.  If he is above 90% with checks he may stay off of oxygen.  I do think it would be reasonable for him to continue to use oxygen with exercise.     4.  Asbestos related pleural effusion and plaquing mostly stable.  No additional intervention.     5.  Rounded atelectasis on previous CT scan, has been stable over the course of the last several CT images.  No additional evaluation at this time.  At my last check we did suggest ongoing monitoring; however, given his age and desire not to do any additional evaluations such as biopsies I would recommend discontinuing serial CT scans at this point.      I will have him come back and see me in approximately 3 months.  At that time if he is no longer hypoxic will discontinue his oxygen order.      TOTAL TIME SPENT:  I spent 30 minutes in this patient's care encounter, greater than 50% of which was in counseling and coordination of care regarding above mentioned issues.         SHALOM SU MD             D: 2017 14:59   T: 2017 15:23   MT: [id number]      Name:     JOHN ALVAREZ   MRN:      [id number]        Account:      [id number]   :      1935           Visit Date:   2017      Document: [id number]

## 2017-01-26 NOTE — Clinical Note
1/26/2017      RE: Jossue Mendes  [address] [facility] [address]       CHIEF COMPLAINT:  Recurrent pneumonia and asbestos associated pleural effusion.      HISTORY OF PRESENT ILLNESS:  Jossue Mendes is a 81-year-old man known to me from previous visits.  He has had recurrent presumed aspiration pneumonias and history of asbestos related pleural effusions and rounded atelectasis.  Since our last visit he had an additional episode of pneumonia.  He was hospitalized at [facility] and underwent bronchoscopy.  At that time he had mucoid and mucopurulent secretions in his lower airways.  These were removed.  The BAL was performed and he had 56 white cells on BAL.  Cultures remain negative.  He was discharged to a rehab facility.  He is currently receiving speech language therapy on a daily basis.  He was discharged on albuterol, ipratropium nebulizers which he has been using on a 3 times a day basis.  He finds the device cumbersome, it does not improve his breathing and dislikes using the nebulizer.  He has also been using oxygen via nasal cannula, which is new following discharge.  He has been evaluated for low hemoglobin, has received 2 units of packed red cells for anemia.  He says that overall his energy is poor.  He has had some weight loss related to low appetite and the unappetizing nature of food at his rehab center.  His wife is bringing him in food to eat.      PAST MEDICAL HISTORY:   1.  CKD.   2.  Rheumatoid arthritis.   3.  Coronary artery disease, status post CABG in 2012.   4.  Heart failure with preserved EF 55-60%.   5.  Diabetes.   6.  AAA, status post AVR in 2009.   7.  History of carotid endarterectomy.   8.  Right pleural effusion since 2011.   9.  Right leg amputation.   10.  History of cerebrovascular accident.   11.  asbestos associated pleural plaquing.      FAMILY HISTORY:  Mother had coronary artery disease.      SOCIAL HISTORY:  The patient has approximately  60-pack-year smoking history, quit in 1986, served 20 years in the [email or web address].      REVIEW OF SYSTEMS:  A full 14-point review of systems was done and is negative except as noted above in the HPI.      PHYSICAL EXAMINATION:   VITAL SIGNS:  Blood pressure 117/69, pulse is 80, respiratory rate is 18, SpO2 is 97% on 1 liter via nasal cannula.   GENERAL APPEARANCE:  Examined in a wheelchair.   EYES:  PERRL.  No conjunctival injection.   RESPIRATORY:  Decreased breath sounds at the right base.  Scattered rales, clear with coughing, expectoration of tan sputum noted during the exam.   CARDIOVASCULAR:  Regular rate and rhythm, normal S1, S2, no murmurs, rubs or gallops.      DIAGNOSTICS:      Most recent CT chest 11/15/2016 formal impression by Dr. Antonio:   1.  Innumerable solid and ground-glass pulmonary nodules right lung is slightly decreased compared to 5/12/2016 and 4/18/2016, most compatible with resolving infectious process.   2.  Bilateral plaques and chronic heterogenous pleural effusions with intermediate density, right greater than left.  The left effusion is likely loculated.  Findings raise suspicion for asbestos related pleural disease.   3.  Right basilar rounded density most compatible with round atelectasis.  Tumor is less likely.   4.  Extensive atherosclerotic disease in the chest and abdomen, notably there is severe atherosclerotic calcifications in the major artery branches of the aortic arch and visceral arteries.   5.  Scattered bronchial wall thickening suggestive of bronchitis.      ASSESSMENT AND PLAN:  The patient is a 81-year-old man here for ongoing followup of COPD and asbestos associated pleural disease.     1.  Recurrent pneumonia.  These have been strongly suspected to be related to a history of aspiration.  He has had extensive evaluation for this in the past and is currently doing well and receiving daily speech therapy.     2.  Chronic obstructive pulmonary disease.  The patient likely has  a history of COPD based on imaging findings and previous PFTs demonstrating mild obstruction.  He has been asymptomatic and I have not initiated him on inhalers in the past.  He has been using nebulizers for the last few months since discharge from the hospital.  Since he feels no symptomatic benefit from use of the inhaler we will discontinue them at this time.     3.  Hypoxia.  The patient was discharged from the hospital on oxygen.   I wrote an order for his rehabilitation center to check his SpO2 3 times a day off of oxygen.  If he is above 90% with checks he may stay off of oxygen.  I do think it would be reasonable for him to continue to use oxygen with exercise.     4.  Asbestos related pleural effusion and plaquing mostly stable.  No additional intervention.     5.  Rounded atelectasis on previous CT scan, has been stable over the course of the last several CT images.  No additional evaluation at this time.  At my last check we did suggest ongoing monitoring; however, given his age and desire not to do any additional evaluations such as biopsies I would recommend discontinuing serial CT scans at this point.      I will have him come back and see me in approximately 3 months.  At that time if he is no longer hypoxic will discontinue his oxygen order.      TOTAL TIME SPENT:  I spent 30 minutes in this patient's care encounter, greater than 50% of which was in counseling and coordination of care regarding above mentioned issues.         NELIA SU MD             D: 2017 14:59   T: 2017 15:23   MT: [id number]      Name:     JOHN ALVAREZ   MRN:      [id number]        Account:      [id number]   :      1935           Visit Date:   2017      Document: [id number]      Nelia Su MD

## 2017-01-27 NOTE — TELEPHONE ENCOUNTER
Called and spoke to patient's wife as stated in Dr. Wang's note leave catheter in place and change q month. Caller agrees. Cheri Peterson RN

## 2017-02-01 PROBLEM — N18.30 CHRONIC KIDNEY DISEASE, STAGE III (MODERATE) (H): Status: ACTIVE | Noted: 2017-01-01

## 2017-02-01 PROBLEM — D63.1 ANEMIA OF CHRONIC RENAL FAILURE: Status: ACTIVE | Noted: 2017-01-01

## 2017-02-01 PROBLEM — N18.9 ANEMIA OF CHRONIC RENAL FAILURE: Status: ACTIVE | Noted: 2017-01-01

## 2017-02-02 NOTE — Clinical Note
[facility] [address]  [place] [address]  Phone: [phone]  Fax: [phone]        2017    RE:  Jossue Mendes-:  [birth year]    Jossue Mendes and his wife return to see in the office today.  He is well known to me for his vascular problems.  He underwent a right CEA in 2009 and a left CEA in .  We noted an asymptomatic occlusion of the left common carotid artery several years ago and decided that no intervention was indicated.  Except for some gradual memory changes he is remain asymptomatic.    He also underwent a repair of his infrarenal abdominal pannus and with EVAR in 2012.  He's had no evidence of an endoleak.  He has known PAD and his left leg with a successful great toe amputation.  He has a right above-knee amputation.    I saw the patient the hospital more recently when he's being treated for pneumonia and anemia.  He does have chronic renal insufficiency and will be receiving intravenous iron supplements after requiring two units packed red blood cell during his hospitalization.  Recent laboratory:  Hemoglobin= 7.5   Serum creatinine= 2.52.      He is at the [facility].  His primary complaint is soreness of his buttocks  Despite a RoHo cushion on his wheelchair ( nonambulatory due to AKA).  Reports his appetite is fairly good.  He seems to be recovering well from his pneumonia.    PMH:  Medications were reviewed and documented in Epic.  Nonsmoker.  Presently off oxygen and breathing well.    Exam: Alert and appropriate.  He is in his wheelchair.  Blood pressure 132/63 left arm.  Pulse 80.  Well-healed carotid incisions.  Chest= clear   Cardiovascular=RR  Right AKA.  No ulcerations and left foot with well-healed great toe amputation.  Intact sensation.  No palpable pedal pulses.    Bedside Doppler reveals minimal left posterior tibial flow.  A monophasic left anterior tibial and dorsalis pedis  signal is noted.          Duplex of his aorta reveals a well functioning EVAR  Graft with no evidence of endoleak.  Aneurysm sac is stable at 4.2 x 4.8 cm.    Carotid duplex reveals a widely patent right CEA with no stenosis.  Left common carotid artery is chronically occluded.  Collaterals refill the external carotid artery with retrograde flow into the internal carotid artery which is stable.          Impression:  #1  Patent EVAR with no evidence of endoleak.  Follow-up duplex one year.    #2   Patent right CEA with chronic occlusion left common carotid artery but still patent via retrograde flow from ECA collaterals.  Repeat duplex one year.    #3   PAD with only monophasic signals and left foot.  No ulcerations are appreciated.  Continue good foot care since any ulcer would be very difficult to heal.  Discussed with patient and wife.      Rj Quan MD

## 2017-02-02 NOTE — PROGRESS NOTES
"Chief Complaint   Patient presents with     RECHECK     AAA & bilateral carotid (9:00 [facility]; 10:30 [facility]) 1 year follow up, hx of AAA repaired on 2/25/2012 with an EVAR; Right CEA on 6/10/2009        Initial /63 mmHg  Pulse 80 Estimated body mass index is 20.37 kg/(m^2) as calculated from the following:    Height as of 1/6/17: 5' 9.02\" (1.753 m).    Weight as of 1/26/17: 138 lb (62.596 kg).  BP completed using cuff size: regular, left arm, sitting    Face to face nursing time: 8 minutes    Celestina Trevino MA        "

## 2017-02-02 NOTE — PROGRESS NOTES
Jossue Mendes And his wife return to see in the office today.  He is well known to me for his vascular problems.  He underwent a right CEA in June 2009 and a left CEA in 2015.  We noted an asymptomatic occlusion of the left common carotid artery several years ago and decided that no intervention was indicated.  Except for some gradual memory changes he is remain asymptomatic.    He also underwent a repair of his infrarenal abdominal pannus and with EVAR in February 2012.  He's had no evidence of an endoleak.  He has known PAD and his left leg with a successful great toe amputation.  He has a right above-knee amputation.    I saw the patient the hospital more recently when he's being treated for pneumonia and anemia.  He does have chronic renal insufficiency and will be receiving intravenous iron supplements after requiring two units packed red blood cell during his hospitalization.  Recent laboratory:  Hemoglobin= 7.5   Serum creatinine= 2.52.      He is at the [facility].  His primary complaint is soreness of his buttocks  Despite a RoHo cushion on his wheelchair ( nonambulatory due to AKA).  Reports his appetite is fairly good.  He seems to be recovering well from his pneumonia.    PMH:  Medications were reviewed and documented in Epic.  Nonsmoker.  Presently off oxygen and breathing well.    Exam: Alert and appropriate.  He is in his wheelchair.  Blood pressure 132/63 left arm.  Pulse 80.             Well-healed carotid incisions.  Chest= clear   Cardiovascular=RR             Right AKA.  No ulcerations and left foot with well-healed great toe amputation.  Intact sensation.  No palpable pedal pulses.             Bedside Doppler reveals minimal left posterior tibial flow.  A monophasic left anterior tibial and dorsalis pedis signal is noted.          Duplex of his aorta reveals a well functioning EVAR  Graft with no evidence of endoleak.  Aneurysm sac is stable at 4.2 x 4.8 cm.    Carotid  duplex reveals a widely patent right CEA with no stenosis.  Left common carotid artery is chronically occluded.  Collaterals refill the external carotid artery with retrograde flow into the internal carotid artery which is stable.          Impression:  #1  Patent EVAR with no evidence of endoleak.  Follow-up duplex one year.                        #2   Patent right CEA with chronic occlusion left common carotid artery but still patent via retrograde flow from ECA collaterals.  Repeat duplex one year.                        #3   PAD with only monophasic signals and left foot.  No ulcerations are appreciated.  Continue good foot care since any ulcer would be very difficult to heal.  Discussed with patient and wife.      We spent 15 minutes in the office today with over 50% in counseling.       Rj Quan MD       Please route or send letter to:  Primary Care Provider (PCP)

## 2017-02-03 NOTE — PROGRESS NOTES
Quick Note:    Previous Left Duplex with very slow flow in CCA implying proximal disease. Decided against any intervention at last vist since ICA open via collateral to ECA and the to ICA. Now with complete CCA occlusion with no symptoms.     Wm Kadeem OCHOA    ______

## 2017-02-08 NOTE — PROGRESS NOTES
SN to perform assessment with focus on medication management and reconciliation, pain management, mental health status and need for referral or change in treatment plan, skin integrity, falls risk,   Instruct on disease process, signs/symptoms of complications to report to agency/physician/911, emergency/safety and falls prevention plan, medication effects/SE, new/high risk/changed medications, diet, and activity. Implement interventions to monitor and mitigate pain, prevent pressure ulcers and confirm proper foot care.   3 prn visits for change in condition, symptom manamgent.  supervisory visits per agency protocol, recertification assessments.  Visits may be in person or via video conference based upon patient needs.  Also need orders for PT/OT to eval and tx and for HHA to assit for bathing

## 2017-02-08 NOTE — PROGRESS NOTES
Infusion Nursing Note:  Jossue Mendes presents today for Injectofer.    Patient seen by provider today: No   present during visit today: Not Applicable.    Note: N/A.    Intravenous Access:  Peripheral IV placed.    Treatment Conditions:  Not Applicable.      Post Infusion Assessment:  Patient tolerated infusion without incident.  No evidence of extravasations.  Access discontinued per protocol.    Discharge Plan:    Patient will follow up with ordering provider.  Patient discharged in stable condition accompanied by: self, wife and daughter.  Departure Mode: Wheelchair.    Lisa Caballero RN

## 2017-02-09 NOTE — PROGRESS NOTES
Home care nurse Dilia returned call.    Informed Dilia that below requested orders were approved by Dr. Rabago.  Home care nurse verbalized good understanding.  Georgina Navarro RN

## 2017-02-09 NOTE — PROGRESS NOTES
This writer attempted to contact home care nurse Dilia poole Jossue on 02/09/2017.    Was call answered?  No.  Left message on voicemail with information to call me back - unable to leave detailed message as it was not a confidential voicemail.    If patient calls back, please Contact Clinic RN team. If no one available, send encounter message    Georgina Navarro

## 2017-02-10 NOTE — TELEPHONE ENCOUNTER
Reason for Call:  Home Health Care    Albalphonso with [facility] called regarding (reason for call): Orders    Orders are needed for this patient.     PT: two times a week for two weeks to work on increasing strength and mobility towards independence.     Pt Provider: Dr. Konstantin Rabago    Phone Number Homecare Nurse can be reached at: [phone]    Can we leave a detailed message on this number? YES    Best Time: Anytime    Call taken on 2/10/2017 at 2:44 PM by Rex Callahan

## 2017-02-10 NOTE — TELEPHONE ENCOUNTER
Last office visit: 01/18/17    Per standing orders request for physical therapy has been approved.    RN informed Alba of the request.    Caitie Reyes RN

## 2017-02-15 NOTE — PROGRESS NOTES
"  SUBJECTIVE:                                                    Jossue Mendes is a 81 year old male who presents to clinic today for the following health issues:          Hospital Follow-up Visit:    Hospital/Nursing Home/IP Rehab Facility: [facility]  Date of Admission: 1/6/17  Date of Discharge: 1/9/17  Reason(s) for Admission: Weakness and deconditioning   Anemia            Problems taking medications regularly:  None       Medication changes since discharge: None       Problems adhering to non-medication therapy:  None    Summary of hospitalization:  [facility] discharge summary reviewed  Diagnostic Tests/Treatments reviewed.  Follow up needed: none  Other Healthcare Providers Involved in Patient s Care:         None  Update since discharge: improved.     Post Discharge Medication Reconciliation: discharge medications reconciled, continue medications without change.  Plan of care communicated with patient     Coding guidelines for this visit:  Type of Medical   Decision Making Face-to-Face Visit       within 7 Days of discharge Face-to-Face Visit        within 14 days of discharge   Moderate Complexity 26491 [id number]   High Complexity 51252 [id number]            Problem list and histories reviewed & adjusted, as indicated.  Additional history: as documented    Problem list, Medication list, Allergies, and Medical/Social/Surgical histories reviewed in EPIC and updated as appropriate.    ROS:  Constitutional, HEENT, cardiovascular, pulmonary, GI, , musculoskeletal, neuro, skin, endocrine and psych systems are negative, except as otherwise noted.    OBJECTIVE:                                                    /63  Pulse 80  Ht 5' 9.02\" (1.753 m)  Wt 140 lb (63.5 kg)  SpO2 95%  BMI 20.66 kg/m2  Body mass index is 20.66 kg/(m^2).  GENERAL: healthy, alert and no distress  NECK: no adenopathy, no asymmetry, masses, or scars and thyroid normal to palpation  RESP: lungs clear to auscultation " - no rales, rhonchi or wheezes  CV: regular rate and rhythm, normal S1 S2, no S3 or S4, no murmur, click or rub, no peripheral edema and peripheral pulses strong  ABDOMEN: soft, nontender, no hepatosplenomegaly, no masses and bowel sounds normal  MS: no gross musculoskeletal defects noted, no edema  FOOT EXAM: left foot no skin lesions, pulses/sensation intact  Diagnostic Test Results:  none      ASSESSMENT/PLAN:                                                      1. Anemia of chronic renal failure, stage 3 (moderate)  Patient looked better today, stronger. Patient primarily here for recheck labs. Patient has been started on iron infusion and Aranesp per nephrology. Recheck labs today. Will need to fax labs to Dr. Leonard Irene at [phone] at [facility].  - CBC with platelets differential  - Basic metabolic panel    2. Screening for diabetic peripheral neuropathy    - FOOT EXAM  NO CHARGE [[id number]]    3. Benign prostatic hyperplasia with lower urinary tract symptoms, unspecified morphology    - tamsulosin (FLOMAX) 0.4 MG capsule; Take 2 capsules (0.8 mg) by mouth daily  Dispense: 180 capsule; Refill: 3    See Patient Instructions    Konstantin Rabago MD, MD  [facility]

## 2017-02-15 NOTE — PROGRESS NOTES
[facility] now requests orders and shares plan of care/discharge summaries for some patients through [employer].  Please REPLY TO THIS MESSAGE in order to give authorization for orders when needed.  This is considered a verbal order, you will still receive a faxed copy of orders for signature.  Thank you for your assistance in improving collaboration for our patients.    ORDER    OT evaluation completed. Requesting 4 OT visits this month to address cognitive assessments, caregiver training, and BUE strength for increased safety with toileting and functional transfers.

## 2017-02-15 NOTE — MR AVS SNAPSHOT
After Visit Summary   2/15/2017    Jossue Mendes    MRN: [id number]           Patient Information     Date Of Birth          1935        Visit Information        Provider Department      2/15/2017 2:20 PM Konstantin Rabago MD [facility]        Today's Diagnoses     Anemia of chronic renal failure, stage 3 (moderate)    -  1    Screening for diabetic peripheral neuropathy        Benign prostatic hyperplasia with lower urinary tract symptoms, unspecified morphology          Care Instructions    How to contact your care team providers:    [facility]  [phone]  Pharmacy [phone]    BELEN Strong Dr., Dr., Dr., PA-C    Team RN: Damián MEADOWS and Ivett ROCA    Clinic hours  M-Th 7am-7pm   Fri 7am-5pm.   Urgent care M-F 11am-9pm,   Sat/sun 9am-5pm.  Pharmacy M-F 8:00am-8pm Sat/sun 9am-5pm.     All password changes, disabled accounts, or ID changes in [employer]/MyHealth will be done by our Access Services Department.   If you need help with your account or password, call: [phone]. Clinic staff no longer has the ability to change passwords.                       Follow-ups after your visit        Your next 10 appointments already scheduled     Mar 08, 2017 10:50 AM CST   Return Visit with MIREYA Ulloa CNP   [facility] PHYSICI[facility] [place] ([facility])    [address] [place] [id number] [phone]            Apr 13, 2017  3:30 PM CDT   [facility]    [id number] [place] [id number] [phone]              Who to contact     If you have questions or need follow up information about today's clinic visit or your schedule please contact [facility] directly at [phone].  Normal or  "non-critical lab and imaging results will be communicated to you by MyChart, letter or phone within 4 business days after the clinic has received the results. If you do not hear from us within 7 days, please contact the clinic through [employer] or phone. If you have a critical or abnormal lab result, we will notify you by phone as soon as possible.  Submit refill requests through [employer] or call your pharmacy and they will forward the refill request to us. Please allow 3 business days for your refill to be completed.          Additional Information About Your Visit        [employer] Information     [employer] lets you send messages to your doctor, view your test results, renew your prescriptions, schedule appointments and more. To sign up, go to [email or web address] . Click on \"Log in\" on the left side of the screen, which will take you to the Welcome page. Then click on \"Sign up Now\" on the right side of the page.     You will be asked to enter the access code listed below, as well as some personal information. Please follow the directions to create your username and password.     Your access code is: [email or web address]  Expires: 2017  1:05 PM     Your access code will  in 90 days. If you need help or a new code, please call your [facility] clinic or [phone].        Care EveryWhere ID     This is your Care EveryWhere ID. This could be used by other organizations to access your [facility] medical records          Your Vitals Were     Pulse Height Pulse Oximetry BMI (Body Mass Index)          80 5' 9.02\" (1.753 m) 95% 20.66 kg/m2         Blood Pressure from Last 3 Encounters:   02/15/17 131/63   17 151/73   17 132/63    Weight from Last 3 Encounters:   02/15/17 140 lb (63.5 kg)   17 138 lb (62.6 kg)   17 137 lb 12.6 oz (62.5 kg)              We Performed the Following     Basic metabolic panel     CBC with platelets differential     FOOT EXAM  NO CHARGE [[id number]]          Where " to get your medicines      These medications were sent to [place] [facility] [address] [facility] [place], [place] - [address]  [address] [place] [address]     Phone:  [phone]     tamsulosin 0.4 MG capsule          Primary Care Provider Office Phone # Fax #    Konstantin Rabago [phone]       [facility] [address]  [facility] [place] [address]        Thank you!     Thank you for choosing [facility]  for your care. Our goal is always to provide you with excellent care. Hearing back from our patients is one way we can continue to improve our services. Please take a few minutes to complete the written survey that you may receive in the mail after your visit with us. Thank you!             Your Updated Medication List - Protect others around you: Learn how to safely use, store and throw away your medicines at www.disposemymeds.org.          This list is accurate as of: 2/15/17  2:42 PM.  Always use your most recent med list.                   Brand Name Dispense Instructions for use    ACETAMINOPHEN PO      Take 1,000 mg by mouth 3 times daily as needed for pain       albuterol (2.5 MG/3ML) 0.083% neb solution     360 mL    Take 1 vial (2.5 mg) by nebulization every 2 hours as needed for wheezing or shortness of breath / dyspnea       amLODIPine 5 MG tablet    NORVASC    90 tablet    Take 1 tablet (5 mg) by mouth daily       apixaban ANTICOAGULANT 2.5 MG tablet    ELIQUIS    60 tablet    Take 1 tablet (2.5 mg) by mouth 2 times daily       aspirin 81 MG chewable tablet      Take 81 mg by mouth every evening       calcium 600 MG tablet      Take 1 tablet by mouth 2 times daily       clonazePAM 0.5 MG tablet    klonoPIN    30 tablet    Take 0.5 tablets (0.25 mg) by mouth 2 times daily as needed for anxiety       CRESTOR PO      Take 5 mg by mouth Mon, Wed, Fri evening       ferrous sulfate 325 (65 FE) MG tablet    IRON     Take 325 mg by mouth daily       gabapentin 100 MG  capsule    NEURONTIN    270 capsule    Take 1 capsule (100 mg) by mouth 3 times daily       guaiFENesin 100 MG/5ML Syrp    ROBITUSSIN     Take 5 mLs by mouth every 6 hours as needed for cough       hydrALAZINE 25 MG tablet    APRESOLINE    540 tablet    Take 25 mg by mouth 2 times daily       HYDROcodone-acetaminophen 7.5-325 MG per tablet    NORCO    45 tablet    Take 0.5 tablets by mouth 3 times daily as needed for moderate to severe pain Take 0.5 tablets by mouth 3 times daily as needed for moderate to severe pain       IMODIUM OR      Take 1-2 tablets by mouth daily as needed (for loose stools)       isosorbide mononitrate 30 MG 24 hr tablet    IMDUR    90 tablet    Take 1 tablet (30 mg) by mouth daily       metoprolol 25 MG tablet    LOPRESSOR    60 tablet    Take 0.5 tablets (12.5 mg) by mouth 2 times daily       nitroglycerin 0.4 MG sublingual tablet    NITROSTAT    90 tablet    Place 1 tablet under the tongue every 5 minutes as needed for chest pain.       order for DME     1 Units    Equipment being ordered: Nebulizer       pantoprazole 40 MG EC tablet    PROTONIX    90 tablet    Take 1 tablet (40 mg) by mouth daily Take 30-60 minutes before a meal.       tamsulosin 0.4 MG capsule    FLOMAX    180 capsule    Take 2 capsules (0.8 mg) by mouth daily       VITAMIN D (CHOLECALCIFEROL) PO      Take 2,000 Units by mouth daily

## 2017-02-15 NOTE — PATIENT INSTRUCTIONS
How to contact your care team providers:    [facility]  [phone]  Pharmacy [phone]    BELEN Strong Dr., Dr., Dr., PA-C    Team RN: Damián ROCA    Clinic hours  M-Th 7am-7pm   Fri 7am-5pm.   Urgent care M-F 11am-9pm,   Sat/sun 9am-5pm.  Pharmacy M-F 8:00am-8pm Sat/sun 9am-5pm.     All password changes, disabled accounts, or ID changes in [employer]/MyHealth will be done by our Access Services Department.   If you need help with your account or password, call: [phone]. Clinic staff no longer has the ability to change passwords.

## 2017-02-15 NOTE — NURSING NOTE
"Chief Complaint   Patient presents with     Hospital F/U     [facility]       Initial /63  Pulse 80  Ht 5' 9.02\" (1.753 m)  Wt 140 lb (63.5 kg)  SpO2 95%  BMI 20.66 kg/m2 Estimated body mass index is 20.66 kg/(m^2) as calculated from the following:    Height as of this encounter: 5' 9.02\" (1.753 m).    Weight as of this encounter: 140 lb (63.5 kg).  Medication Reconciliation: complete     Margarita Boswell MA      "

## 2017-02-21 NOTE — PROGRESS NOTES
[facility] now requests orders and shares plan of care/discharge summaries for some patients through [employer].  Please REPLY TO THIS MESSAGE in order to give authorization for orders when needed.  This is considered a verbal order, you will still receive a faxed copy of orders for signature.  Thank you for your assistance in improving collaboration for our patients.    ORDER    Requesting ST evaluation orders for memory concerns.

## 2017-02-23 ENCOUNTER — TELEPHONE (OUTPATIENT)
Dept: CARDIOLOGY | Facility: CLINIC | Age: 82
End: 2017-02-23

## 2017-02-23 NOTE — TELEPHONE ENCOUNTER
Received VM from pt's wife Norma. She left message that patient passed away last night. She is very appreciative for all the care we have given patient over the years. Messaged update to Dr. Walker, Vale Agustin, and HIM. Sympathy card started and in [place] for providers to sign. ASHA Marie

## 2017-02-27 ENCOUNTER — TELEPHONE (OUTPATIENT)
Dept: OTHER | Facility: CLINIC | Age: 82
End: 2017-02-27

## 2017-02-27 NOTE — TELEPHONE ENCOUNTER
Received a phone call from Norma stating that Jossue passed away on 02/22/17 at [facility].  She said that if we have any questions, we can call her at [phone] (home number listed in chart). Adriana Robertson,

## 2019-02-27 NOTE — PROGRESS NOTES
"[facility]    Hospitalist Progress Note    Date of Service (when I saw the patient): 01/08/2017    Assessment and Plan  Jossue Mendes is a 81 year old male who presents with weakness which appears to have worsened over the past 3 days in the setting of multiple recent hospitalizations including recent hospitalization for approximately one week and discharged 12/31/16 as well as complaints of \"total body ache.\"    Weakness and deconditioning: anticipate multifactorial. Patient is nonambulatory at baseline, though transfers from bed to wheelchair with his history of right AKA. Patient has a prosthesis, though does not use this. With multiple recent hospitalizations including one week hospitalization with discharge 12/31/16, anticipate some degree of deconditioning related to this. Home Occupational therapy assessed patient 1/3/16, recommendation was for 2 additional visits this month.   Received blood transfusion x2 with improvement in hgb, as discussed below.    - PT evaluated, recommending TCU  - Social work consulted and following; working on TCU placement  - Continue lortab elixir 7.5mL TID PRN, which is equivalent to pt's home tablet dose    Anemia: Suspect multifactorial in the setting of chronic kidney disease, chronic anticoagulation, anemia of chronic disease with recent multiple hospitalizations for right sided pneumonia. Patient is on both Chronic anticoagulation as well as aspirin given history of coronary artery disease and atrial fibrillation. Per discussion of wife, primary cardiologist, Dr. Walker, is aware of this. Negative occult blood in ED. Patient has had a history of erosive gastritis on EGD in 2011, though resolved on repeat 2015.  Hgb 7.0--9.9 after 2u PRBCs.  - Continue Protonix 40 mg daily  - Continuing apixaban as well as 81 mg aspirin; low threshold to discontinue aspirin in the setting of chronic anticoagulation.   - Continue prior to admission iron supplementation; note " Room # 7 [facility] 2/12/19 C/o palpitations Visit Vitals /62 (BP 1 Location: Left arm, BP Patient Position: Sitting) Pulse 72 Resp 20 Ht 5' 2\" (1.575 m) Wt 197 lb 9.6 oz (89.6 kg) SpO2 95% Comment: oxygen 2l/m via n/c  
BMI 36.14 kg/m² iron saturation of 11% on admission  - Discussed with patient and wife regarding goals of care. Patient has a POLST on file describing a limited care plan focused on comfort and remaining at home.    - Recommend follow-up visit with primary provider, Dr. Rabago, in approximately 1 week with recheck of CBC and discussion at that time regarding outpatient evaluation through gastroenterology. If outpatient evaluation is pursued, consider discontinuation of Eliquis approximately 5 days prior to endoscopy. Patient and wife are in agreement with this plan, they themselves are uncertain if they would want to pursue endoscopy.    Type 2 diabetes with diabetic retinopathy, nephropathy: most recent hemoglobin A1c in 2016 of 7.0, on repeat today 6.9.  Typically diet controlled.  -medium dose sliding scale insulin available    Atrial fibrillation: stable, rate controlled.  Continue eliquis 2.5 mg b.i.d. And metoprolol 12.5 g b.i.d.     Coronary artery disease status post historical coronary artery bypass graftin LIMA to LAD, SVG to PDA and OM 2 with associated historical ischemic cardiomyopathy. Does not appear to be in heart failure currently.  - Continuing chronic anticoagulation as above  - Continues on PTA Hydralazine 25 mg b.i.d., Imdur 30 mg daily, Metoprolol 12.5 mg b.i.d., Rosuvastatin 5 mg daily, Amlodipine 5 mg daily, Aspirin 81 mg daily    Urinary retention with BPH: patient with significant urinary retention two admissions ago with García catheter placed 16. Followed up with urology as an outpatient with removal of García catheter 16.  UA without evidence of infection.  Patient overnight day #2 had persistent urinary retention requiring straight catherization x1 for 550cc urine at 0700, with ongoing retention throughout day requiring garcía catheter placement.  - García management  - Intake and output  - Continue prior to admission Flomax  - Follow up with Urology within one week    DVT Prophylaxis:  Warfarin  Code Status: DNR/DNI    Disposition: Expected discharge in 1-2 days once safe discharge established.    Darwin Downs PA-C    Interval History  Patient sitting up in bed, denies discomfort presently as is on prior pain medications.  Having continued weakness, feels is slightly improving.  Continues to plan on TCU at discharge.  No cp, sob.  No acute concerns per pt or wife.    -Data reviewed today: I reviewed all new labs and imaging results over the last 24 hours. I personally reviewed no images or EKG's today.    Physical Exam  Temp: 98.1  F (36.7  C) Temp src: Oral BP: 129/63 mmHg   Heart Rate: 78 Resp: 16 SpO2: 96 % O2 Device: Nasal cannula Oxygen Delivery: 2 LPM  Filed Vitals:    01/06/17 1527 01/08/17 0700   Weight: 62.2 kg (137 lb 2 oz) 62.5 kg (137 lb 12.6 oz)     Vital Signs with Ranges  Temp:  [96.9  F (36.1  C)-98.3  F (36.8  C)] 98.1  F (36.7  C)  Heart Rate:  [71-80] 78  Resp:  [16] 16  BP: (113-165)/(63-72) 129/63 mmHg  SpO2:  [94 %-96 %] 96 %  I/O last 3 completed shifts:  In: 360 [P.O.:360]  Out: 1050 [Urine:1050]    GEN: well-developed, thin male, appears comfortable  PULM: lungs CTA bilaterally, no increased work of breathing, no wheeze, crackles, rhonchi  CV: irregularly irregular, S1/S2  GI: soft, nontender, nondistended, +BS in all 4 quadrants  SKIN: pale, warm & dry without rash or wound, no pedal edema    Medications       sodium chloride (PF)  3 mL Intracatheter Q8H     amLODIPine  5 mg Oral Daily     apixaban ANTICOAGULANT  2.5 mg Oral BID     aspirin  81 mg Oral QPM     ferrous sulfate  325 mg Oral Daily     gabapentin  100 mg Oral TID     hydrALAZINE  25 mg Oral BID     ipratropium - albuterol 0.5 mg/2.5 mg/3 mL  3 mL Nebulization 4x Daily     isosorbide mononitrate  30 mg Oral Daily     metoprolol  12.5 mg Oral BID     pantoprazole  40 mg Oral Daily     rosuvastatin (CRESTOR) tablet 5 mg  5 mg Oral Once per day on Mon Wed Fri     tamsulosin  0.4 mg Oral Daily     sodium  chloride (PF)  3 mL Intracatheter Q8H     insulin aspart  1-7 Units Subcutaneous TID AC     insulin aspart  1-5 Units Subcutaneous At Bedtime       Data    Recent Labs  Lab 01/07/17  0630 01/06/17  1200   WBC 9.2 7.2   HGB 9.9* 7.0*   MCV 90 89    242   INR  --  1.51*   NA  --  145*   POTASSIUM  --  4.2   CHLORIDE  --  109   CO2  --  27   BUN  --  26   CR  --  2.16*   ANIONGAP  --  9   AURORA  --  7.9*   GLC  --  144*   TROPI  --  <0.015The 99th percentile for upper reference range is 0.045 ug/L.  Troponin values in the range of 0.045 - 0.120 ug/L may be associated with risks of adverse clinical events.       No results found for this or any previous visit (from the past 24 hour(s)).